# Patient Record
Sex: MALE | Race: BLACK OR AFRICAN AMERICAN | Employment: OTHER | URBAN - METROPOLITAN AREA
[De-identification: names, ages, dates, MRNs, and addresses within clinical notes are randomized per-mention and may not be internally consistent; named-entity substitution may affect disease eponyms.]

---

## 2017-04-20 ENCOUNTER — OFFICE VISIT (OUTPATIENT)
Dept: INTERNAL MEDICINE CLINIC | Age: 77
End: 2017-04-20

## 2017-04-20 VITALS
OXYGEN SATURATION: 100 % | TEMPERATURE: 95.5 F | WEIGHT: 147 LBS | DIASTOLIC BLOOD PRESSURE: 78 MMHG | HEIGHT: 67 IN | RESPIRATION RATE: 16 BRPM | HEART RATE: 58 BPM | SYSTOLIC BLOOD PRESSURE: 180 MMHG | BODY MASS INDEX: 23.07 KG/M2

## 2017-04-20 DIAGNOSIS — I10 ESSENTIAL HYPERTENSION: ICD-10-CM

## 2017-04-20 DIAGNOSIS — E11.21 DIABETIC NEPHROPATHY ASSOCIATED WITH TYPE 2 DIABETES MELLITUS (HCC): Primary | ICD-10-CM

## 2017-04-20 DIAGNOSIS — R10.31 GROIN PAIN, RIGHT: ICD-10-CM

## 2017-04-20 LAB — HBA1C MFR BLD HPLC: 7.5 %

## 2017-04-20 RX ORDER — LISINOPRIL 20 MG/1
TABLET ORAL
Refills: 11 | COMMUNITY
Start: 2017-03-23 | End: 2017-05-11

## 2017-04-20 RX ORDER — LANOLIN ALCOHOL/MO/W.PET/CERES
CREAM (GRAM) TOPICAL
Refills: 8 | COMMUNITY
Start: 2017-03-23 | End: 2018-01-01

## 2017-04-20 RX ORDER — CALCITRIOL 0.25 UG/1
CAPSULE ORAL
Refills: 9 | COMMUNITY
Start: 2017-03-23 | End: 2018-01-01

## 2017-04-20 NOTE — PROGRESS NOTES
Deborah Glass presents today for   Chief Complaint   Patient presents with    Hypertension    Diabetes    Other     possible hernia, pulled muscle       Deborah Glass preferred language for health care discussion is english/other. Is someone accompanying this pt? no    Is the patient using any DME equipment during OV? no    Depression Screening:  PHQ 2 / 9, over the last two weeks 12/8/2016 8/31/2016 6/14/2016 5/11/2016 1/5/2015 2/14/2014   Little interest or pleasure in doing things Not at all Not at all Not at all Not at all Not at all Not at all   Feeling down, depressed or hopeless Not at all Not at all Not at all Not at all Not at all Not at all   Total Score PHQ 2 0 0 0 0 0 0       Learning Assessment:  Learning Assessment 6/8/2015 11/5/2013   PRIMARY LEARNER Patient Patient   HIGHEST LEVEL OF EDUCATION - PRIMARY LEARNER  GRADUATED HIGH SCHOOL OR GED GRADUATED HIGH SCHOOL OR GED   PRIMARY [address]    NEED - No   LEARNER PREFERENCE PRIMARY DEMONSTRATION VIDEOS   LEARNING SPECIAL TOPICS - no   ANSWERED BY patient patient   RELATIONSHIP SELF SELF       Abuse Screening:  Abuse Screening Questionnaire 5/11/2016 6/8/2015 3/4/2015   Do you ever feel afraid of your partner? N N N   Are you in a relationship with someone who physically or mentally threatens you? N N N   Is it safe for you to go home? Pepe Reynolds       Fall Risk  Fall Risk Assessment, last 12 mths 12/8/2016 8/31/2016 6/14/2016 5/11/2016 1/5/2015 2/14/2014   Able to walk? Yes Yes Yes Yes Yes Yes   Fall in past 12 months? No No No No No No       Health Maintenance reviewed and discussed per provider. Yes    Deborah Glass is due for tdap, zoster, pneu. Please order/place referral if appropriate. Advance Directive:  1. Do you have an advance directive in place? Patient Reply: no    2. If not, would you like material regarding how to put one in place? Patient Reply: no    Coordination of Care:  1.  Have you been to the ER, urgent care clinic since your last visit? Hospitalized since your last visit? no    2. Have you seen or consulted any other health care providers outside of the [address] [place] [address] since your last visit? Include any pap smears or colon screening. No    Patient is fasting today.

## 2017-04-20 NOTE — MR AVS SNAPSHOT
Visit Information Date & Time Provider Department Dept. Phone Encounter #  
 4/20/2017  7:30 AM [address] [phone] Follow-up Instructions Return in about 4 months (around 8/20/2017) for diabetes. Upcoming Health Maintenance Date Due DTaP/Tdap/Td series (1 - Tdap) 1/29/1961 ZOSTER VACCINE AGE 60> 1/29/1999 FOOT EXAM Q1 9/27/2014 EYE EXAM RETINAL OR DILATED Q1 4/28/2017 Pneumococcal 65+ Low/Medium Risk (2 of 2 - PPSV23) 12/28/2017* MEDICARE YEARLY EXAM 5/12/2017 HEMOGLOBIN A1C Q6M 6/6/2017 LIPID PANEL Q1 8/31/2017 MICROALBUMIN Q1 12/6/2017 GLAUCOMA SCREENING Q2Y 4/28/2018 COLONOSCOPY 7/8/2021 *Topic was postponed. The date shown is not the original due date. Allergies as of 4/20/2017  Review Complete On: 4/20/2017 By: Tequila Holloway MD  
  
 Severity Noted Reaction Type Reactions Lisinopril  11/20/2014    Other (comments) Current Immunizations  Reviewed on 11/15/2013 Name Date Influenza High Dose Vaccine PF 10/1/2015 Influenza Vaccine Split 11/1/2012 Pneumococcal Vaccine (Unspecified Type) 11/11/2005 Not reviewed this visit You Were Diagnosed With   
  
 Codes Comments Diabetic nephropathy associated with type 2 diabetes mellitus ([facility]    -  Primary ICD-10-CM: E11.21 
ICD-9-CM: 250.40, 583.81 Essential hypertension     ICD-10-CM: I10 
ICD-9-CM: 401.9 Groin pain, right     ICD-10-CM: R10.31 ICD-9-CM: 789.09 Vitals BP Pulse Temp Resp Height(growth percentile) Weight(growth percentile) 180/78 (BP 1 Location: Left arm, BP Patient Position: Sitting) (!) 58 95.5 °F (35.3 °C) (Oral) 16 5' 7\" (1.702 m) 147 lb (66.7 kg) SpO2 BMI Smoking Status 100% 23.02 kg/m2 Former Smoker Vitals History BMI and BSA Data Body Mass Index Body Surface Area 23.02 kg/m 2 1.78 m 2 Preferred Pharmacy Pharmacy Name Phone [address] [place] [address].  [address] [phone] Your Updated Medication List  
  
   
This list is accurate as of: 4/20/17  8:26 AM.  Always use your most recent med list. amLODIPine 10 mg tablet Commonly known as:  NORVASC  
take 1 tablet by mouth once daily  
  
 aspirin delayed-release 81 mg tablet Take 1 Tab by mouth daily. atorvastatin 80 mg tablet Commonly known as:  LIPITOR  
TAKE 1 TABLET BY MOUTH EVERY NIGHT AT BEDTIME  
  
 calcitRIOL 0.25 mcg capsule Commonly known as:  ROCALTROL TK 1 C PO D  
  
 calcium-cholecalciferol (D3) tablet Commonly known as:  CALTRATE 600+D Take 1 Tab by mouth daily. carvedilol 12.5 mg tablet Commonly known as:  COREG  
TAKE 1 TABLET BY MOUTH TWICE DAILY WITH MEALS  
  
 ferrous sulfate 325 mg (65 mg iron) tablet TK 1 T PO QOD  
  
 folic acid 1 mg tablet Commonly known as:  FOLVITE  
take 1 tablet by mouth once daily  
  
 furosemide 40 mg tablet Commonly known as:  LASIX 1qd Insulin Lisp & Lisp Prot (Hum) 100 unit/mL (75-25) Inpn Commonly known as:  HumaLOG Mix 75-25 KwikPen 10 units am and 8 units pm  
  
 isosorbide mononitrate ER 30 mg tablet Commonly known as:  IMDUR  
1qd  
  
 lisinopril 20 mg tablet Commonly known as:  Miguel Ángel Leaver TK 1 T PO D  
  
 omeprazole 20 mg capsule Commonly known as:  PRILOSEC Take 1 Cap by mouth daily. pentoxifylline  mg CR tablet Commonly known as:  TRENTAL TAKE 1 TABLET BY MOUTH THREE TIMES DAILY  
  
 SYSTANE OP Apply  to eye. We Performed the Following AMB POC HEMOGLOBIN A1C [79246 CPT(R)]  DIABETES FOOT EXAM [7 Custom] Follow-up Instructions Return in about 4 months (around 8/20/2017) for diabetes. Patient Instructions Learning About Diabetes Food Guidelines Your Care Instructions Meal planning is important to manage diabetes. It helps keep your blood sugar at a target level (which you set with your doctor). You don't have to eat special foods. You can eat what your family eats, including sweets once in a while. But you do have to pay attention to how often you eat and how much you eat of certain foods. You may want to work with a dietitian or a certified diabetes educator (CDE) to help you plan meals and snacks. A dietitian or CDE can also help you lose weight if that is one of your goals. What should you know about eating carbs? Managing the amount of carbohydrate (carbs) you eat is an important part of healthy meals when you have diabetes. Carbohydrate is found in many foods. · Learn which foods have carbs. And learn the amounts of carbs in different foods. ¨ Bread, cereal, pasta, and rice have about 15 grams of carbs in a serving. A serving is 1 slice of bread (1 ounce), ½ cup of cooked cereal, or 1/3 cup of cooked pasta or rice. ¨ Fruits have 15 grams of carbs in a serving. A serving is 1 small fresh fruit, such as an apple or orange; ½ of a banana; ½ cup of cooked or canned fruit; ½ cup of fruit juice; 1 cup of melon or raspberries; or 2 tablespoons of dried fruit. ¨ Milk and no-sugar-added yogurt have 15 grams of carbs in a serving. A serving is 1 cup of milk or 2/3 cup of no-sugar-added yogurt. ¨ Starchy vegetables have 15 grams of carbs in a serving. A serving is ½ cup of mashed potatoes or sweet potato; 1 cup winter squash; ½ of a small baked potato; ½ cup of cooked beans; or ½ cup cooked corn or green peas. · Learn how much carbs to eat each day and at each meal. A dietitian or CDE can teach you how to keep track of the amount of carbs you eat. This is called carbohydrate counting. · If you are not sure how to count carbohydrate grams, use the Plate Method to plan meals.  It is a good, quick way to make sure that you have a balanced meal. It also helps you spread carbs throughout the day. ¨ Divide your plate by types of foods. Put non-starchy vegetables on half the plate, meat or other protein food on one-quarter of the plate, and a grain or starchy vegetable in the final quarter of the plate. To this you can add a small piece of fruit and 1 cup of milk or yogurt, depending on how many carbs you are supposed to eat at a meal. 
· Try to eat about the same amount of carbs at each meal. Do not \"save up\" your daily allowance of carbs to eat at one meal. 
· Proteins have very little or no carbs per serving. Examples of proteins are beef, chicken, turkey, fish, eggs, tofu, cheese, cottage cheese, and peanut butter. A serving size of meat is 3 ounces, which is about the size of a deck of cards. Examples of meat substitute serving sizes (equal to 1 ounce of meat) are 1/4 cup of cottage cheese, 1 egg, 1 tablespoon of peanut butter, and ½ cup of tofu. How can you eat out and still eat healthy? · Learn to estimate the serving sizes of foods that have carbohydrate. If you measure food at home, it will be easier to estimate the amount in a serving of restaurant food. · If the meal you order has too much carbohydrate (such as potatoes, corn, or baked beans), ask to have a low-carbohydrate food instead. Ask for a salad or green vegetables. · If you use insulin, check your blood sugar before and after eating out to help you plan how much to eat in the future. · If you eat more carbohydrate at a meal than you had planned, take a walk or do other exercise. This will help lower your blood sugar. What else should you know? · Limit saturated fat, such as the fat from meat and dairy products. This is a healthy choice because people who have diabetes are at higher risk of heart disease. So choose lean cuts of meat and nonfat or low-fat dairy products. Use olive or canola oil instead of butter or shortening when cooking. · Don't skip meals. Your blood sugar may drop too low if you skip meals and take insulin or certain medicines for diabetes. · Check with your doctor before you drink alcohol. Alcohol can cause your blood sugar to drop too low. Alcohol can also cause a bad reaction if you take certain diabetes medicines. Follow-up care is a key part of your treatment and safety. Be sure to make and go to all appointments, and call your doctor if you are having problems. It's also a good idea to know your test results and keep a list of the medicines you take. Where can you learn more? Go to http://melissa-carter.info/. Enter M036 in the search box to learn more about \"Learning About Diabetes Food Guidelines. \" Current as of: May 23, 2016 Content Version: 11.2 © 6565-2536 [employer]. Care instructions adapted under license by [employer] (which disclaims liability or warranty for this information). If you have questions about a medical condition or this instruction, always ask your healthcare professional. [place] [address] any warranty or liability for your use of this information. Introducing [facility]! [employer] introduces PreViser patient portal. Now you can access parts of your medical record, email your doctor's office, and request medication refills online. 1. In your internet browser, go to [email or web address]/"[email or web address] 2. Click on the First Time User? Click Here link in the Sign In box. You will see the New Member Sign Up page. 3. Enter your PreViser Access Code exactly as it appears below. You will not need to use this code after youve completed the sign-up process. If you do not sign up before the expiration date, you must request a new code. · PreViser Access Code: [email or web address] Expires: 7/19/2017  8:26 AM 
 
4.  Enter the last four digits of your Social Security Number (xxxx) and Date of Birth (mm/dd/yyyy) as indicated and click Submit. You will be taken to the next sign-up page. 5. Create a [employer] ID. This will be your [employer] login ID and cannot be changed, so think of one that is secure and easy to remember. 6. Create a [employer] password. You can change your password at any time. 7. Enter your Password Reset Question and Answer. This can be used at a later time if you forget your password. 8. Enter your e-mail address. You will receive e-mail notification when new information is available in [address] 9. Click Sign Up. You can now view and download portions of your medical record. 10. Click the Download Summary menu link to download a portable copy of your medical information. If you have questions, please visit the Frequently Asked Questions section of the [employer] website. Remember, [employer] is NOT to be used for urgent needs. For medical emergencies, dial 911. Now available from your iPhone and Android! Please provide this summary of care documentation to your next provider. Your primary care clinician is listed as Tera Mensah. If you have any questions after today's visit, please call [phone].

## 2017-04-20 NOTE — PROGRESS NOTES
HISTORY OF PRESENT ILLNESS  Jarvis Carlos is a 65 y.o. male. HPI Comments: 64 yo male here for f/u of DM, HTN. Reports glc doing better on home monitoring, 118 this morning. He does have occasional hypoglycemia with readings in 50s-60s when he skips meals. Sometimes holds his insulin dose. Has eye exam scheduled. Saw podiatry recently and plans to return to have nails trimmed. Recently started on lisinopril by his nephrologist. Elzbieta Casandra as allergy but he is tolerating this. BP elevated today. He reports white coat HTN and that /60 at home. He notes pulling sensation, discomfort R groin. Thinks he may have pulled a muscle, ? Bulge in area. Not necessarily changed by valsalva. Review of Systems   Constitutional: Negative for chills, fever and weight loss. HENT: Negative for congestion. Eyes: Negative for blurred vision and pain. Respiratory: Negative for cough and shortness of breath. Cardiovascular: Negative for chest pain, palpitations and leg swelling. Gastrointestinal: Negative for blood in stool, heartburn, melena, nausea and vomiting. Genitourinary: Negative for dysuria and hematuria. Musculoskeletal: Negative for joint pain and myalgias. Skin: Negative for itching and rash. Neurological: Negative for dizziness, tingling and headaches. Psychiatric/Behavioral: Negative for depression. The patient is not nervous/anxious.       Past Medical History:   Diagnosis Date    [facility]IO [facility]) [facility]     Anemia in chronic kidney disease     ASCVD (arteriosclerotic cardiovascular disease)     Benign hypertensive kidney disease     CKD (chronic kidney disease) stage 2, GFR 60-89 ml/min 4/18/2013    CKD (chronic kidney disease), stage III     Diabetes mellitus with chronic kidney disease ([facility]     Dialysis patient ([facility] 9/2014    Dyslipidemia     Heart attack ([facility] 9/2014    Hypertension     Left renal artery stenosis (HCC)     NSTEMI (non-ST elevated myocardial infarction) ([facility]     PAD (peripheral artery disease) ([facility]     PAF (paroxysmal atrial fibrillation) ([facility] 9/2014    Renal mass, right      Current Outpatient Prescriptions on File Prior to Visit   Medication Sig Dispense Refill    carvedilol (COREG) 12.5 mg tablet TAKE 1 TABLET BY MOUTH TWICE DAILY WITH MEALS 60 Tab 2    calcium-cholecalciferol, D3, (CALTRATE 600+D) tablet Take 1 Tab by mouth daily. 30 Tab 11    atorvastatin (LIPITOR) 80 mg tablet TAKE 1 TABLET BY MOUTH EVERY NIGHT AT BEDTIME 30 Tab 11    folic acid (FOLVITE) 1 mg tablet take 1 tablet by mouth once daily 30 Tab 11    pentoxifylline CR (TRENTAL) 400 mg CR tablet TAKE 1 TABLET BY MOUTH THREE TIMES DAILY 90 Tab 11    amLODIPine (NORVASC) 10 mg tablet take 1 tablet by mouth once daily 60 Tab 5    omeprazole (PRILOSEC) 20 mg capsule Take 1 Cap by mouth daily. 60 Cap 5    aspirin delayed-release 81 mg tablet Take 1 Tab by mouth daily. 60 Tab 5    Insulin Lisp & Lisp Prot, Hum, (HUMALOG MIX 75-25 KWIKPEN) 100 unit/mL (75-25) inpn 10 units am and 8 units pm 3 Pen 3    furosemide (LASIX) 40 mg tablet 1qd 60 Tab 5    isosorbide mononitrate ER (IMDUR) 30 mg tablet 1qd 60 Tab 5    PROPYLENE GLYCOL/ (SYSTANE OP) Apply  to eye. No current facility-administered medications on file prior to visit. Social History   Substance Use Topics    Smoking status: Former Smoker     Years: 15.00     Types: Cigarettes    Smokeless tobacco: Never Used    Alcohol use No     Visit Vitals    /78 (BP 1 Location: Left arm, BP Patient Position: Sitting)    Pulse (!) 58    Temp 95.5 °F (35.3 °C) (Oral)    Resp 16    Ht 5' 7\" (1.702 m)    Wt 147 lb (66.7 kg)    SpO2 100%    BMI 23.02 kg/m2     Physical Exam   Constitutional: He appears well-developed and well-nourished. No distress.    BP (!) 202/78 (BP 1 Location: Left arm, BP Patient Position: Sitting)  Pulse (!) 58  Temp 95.5 °F (35.3 °C) (Oral)   Resp 16  Ht 5' 7\" (1.702 m)  Wt 147 lb (66.7 kg)  SpO2 100%  BMI 23.02 kg/m2     Eyes: EOM are normal. Right eye exhibits no discharge. Left eye exhibits no discharge. No scleral icterus. Neck: Neck supple. Cardiovascular: Normal rate, regular rhythm and normal heart sounds. Exam reveals no gallop and no friction rub. No murmur heard. Pulmonary/Chest: Effort normal and breath sounds normal. No respiratory distress. He has no wheezes. He has no rales. Abdominal: Soft. He exhibits no distension and no mass. There is no tenderness. There is no rebound and no guarding. Musculoskeletal: He exhibits no edema or tenderness. Lymphadenopathy:     He has no cervical adenopathy. Neurological: He is alert. He exhibits normal muscle tone. Skin: Skin is warm and dry. Psychiatric: He has a normal mood and affect. Diabetic Foot exam: 2+ dorsalis pedis pulses bilaterally. Positive monofilament in all 10 toes and bottoms of both feet. Negative for lesions, signs of infection, or foot abnormalities other than thickened nails. Lab Results   Component Value Date/Time    Hemoglobin A1c 7.8 12/06/2016 11:15 AM    Hemoglobin A1c (POC) 7.6 08/31/2016 10:55 AM     Lab Results   Component Value Date/Time    Sodium 140 12/06/2016 11:15 AM    Potassium 4.7 12/06/2016 11:15 AM    Chloride 104 12/06/2016 11:15 AM    CO2 25 12/06/2016 11:15 AM    Glucose 177 12/06/2016 11:15 AM    BUN 40 12/06/2016 11:15 AM    Creatinine 3.3 12/06/2016 11:15 AM    BUN/Creatinine ratio 18 03/04/2015 02:14 PM    GFR est AA 23.0 12/06/2016 11:15 AM    GFR est non-AA 19 12/06/2016 11:15 AM    Calcium 7.7 12/06/2016 11:15 AM    Bilirubin, total 0.6 08/21/2014 02:41 PM    AST (SGOT) 29 08/21/2014 02:41 PM    Alk.  phosphatase 85 08/21/2014 02:41 PM    Protein, total 6.5 08/21/2014 02:41 PM    Albumin 2.6 12/06/2016 11:15 AM    A-G Ratio 1.6 08/21/2014 02:41 PM    ALT (SGPT) 36 08/21/2014 02:41 PM     Lab Results   Component Value Date/Time    Cholesterol, total 177 08/31/2016 11:42 AM    HDL Cholesterol 54 08/31/2016 11:42 AM    LDL, calculated 106.6 08/31/2016 11:42 AM    VLDL, calculated 16.4 08/31/2016 11:42 AM    Triglyceride 82 08/31/2016 11:42 AM    CHOL/HDL Ratio 3.3 08/31/2016 11:42 AM     ASSESSMENT and PLAN    ICD-10-CM ICD-9-CM    1. Diabetic nephropathy associated with type 2 diabetes mellitus (HCC) E11.21 250.40  DIABETES FOOT EXAM     583.81 AMB POC HEMOGLOBIN A1C   2. Essential hypertension I10 401.9    3. Groin pain, right R10.31 789.09    Repeat A1c today 7.5. Will hold on adjustments given occasional hypoglycemia. Asked that he work on keeping diet more consistent and monitor glc. BP not at goal, but he reports it is okay at home. Seems to be tolerating lisinopril. Will continue with current medication. Asked that he monitor at home. F/u with nephrologist as scheduled next week. No hernia appreciated on exam. He will monitor sx. If persist, will further assess with US.

## 2017-04-20 NOTE — PATIENT INSTRUCTIONS

## 2017-04-27 ENCOUNTER — TELEPHONE (OUTPATIENT)
Dept: INTERNAL MEDICINE CLINIC | Age: 77
End: 2017-04-27

## 2017-04-27 DIAGNOSIS — R10.31 GROIN PAIN, RIGHT: Primary | ICD-10-CM

## 2017-04-27 NOTE — TELEPHONE ENCOUNTER
Patient called and stated that he wants the test for the pain in his  right groin area that you both talked about. He did not know the name of the test but he is willing to have it done now.

## 2017-05-02 ENCOUNTER — OFFICE VISIT (OUTPATIENT)
Dept: INTERNAL MEDICINE CLINIC | Age: 77
End: 2017-05-02

## 2017-05-02 VITALS
DIASTOLIC BLOOD PRESSURE: 76 MMHG | TEMPERATURE: 95.9 F | HEART RATE: 56 BPM | WEIGHT: 145 LBS | SYSTOLIC BLOOD PRESSURE: 190 MMHG | RESPIRATION RATE: 16 BRPM | OXYGEN SATURATION: 100 % | HEIGHT: 67 IN | BODY MASS INDEX: 22.76 KG/M2

## 2017-05-02 DIAGNOSIS — I10 ESSENTIAL HYPERTENSION: ICD-10-CM

## 2017-05-02 DIAGNOSIS — R10.31 GROIN PAIN, RIGHT: Primary | ICD-10-CM

## 2017-05-02 RX ORDER — ISOSORBIDE MONONITRATE 30 MG/1
TABLET, EXTENDED RELEASE ORAL
Qty: 60 TAB | Refills: 5 | Status: SHIPPED | OUTPATIENT
Start: 2017-05-02 | End: 2018-01-01

## 2017-05-02 NOTE — PATIENT INSTRUCTIONS

## 2017-05-02 NOTE — PROGRESS NOTES
HISTORY OF PRESENT ILLNESS  Carol Lundy is a 65 y.o. male. HPI Comments: 64 yo male here for evaluation of continued R groin pain. Sx present intermittently over the past 1-2 months. No bulge noted. Normal BMs. US had been ordered but he did not get message left for him to schedule. HTN: BP is elevated today. He did not take his morning medications. States it is well controlled when he checks at home with SBP 130s. No CP, SOB. Review of Systems   Constitutional: Negative for chills, fever and weight loss. HENT: Negative for congestion. Eyes: Negative for blurred vision and pain. Respiratory: Negative for cough and shortness of breath. Cardiovascular: Negative for chest pain, palpitations and leg swelling. Gastrointestinal: Negative for blood in stool, constipation, diarrhea, heartburn, melena, nausea and vomiting. Abdominal pain: RLQ, groin. Genitourinary: Negative for frequency and urgency. Musculoskeletal: Negative for joint pain and myalgias. Neurological: Negative for dizziness, tingling and headaches. Psychiatric/Behavioral: Negative for depression. The patient is not nervous/anxious.       Past Medical History:   Diagnosis Date    NA[facility]ARIO [facility]) [facility]     Anemia in chronic kidney disease     ASCVD (arteriosclerotic cardiovascular disease)     Benign hypertensive kidney disease     CKD (chronic kidney disease) stage 2, GFR 60-89 ml/min 4/18/2013    CKD (chronic kidney disease), stage III     Diabetes mellitus with chronic kidney disease ([facility]     Dialysis patient ([facility] 9/2014    Dyslipidemia     Heart attack ([address] 9/2014    Hypertension     Left renal artery stenosis (HCC)     NSTEMI (non-ST elevated myocardial infarction) ([address]     PAD (peripheral artery disease) ([address]     PAF (paroxysmal atrial fibrillation) ([facility] 9/2014    Renal mass, right      Current Outpatient Prescriptions on File Prior to Visit   Medication Sig Dispense Refill    ferrous sulfate 325 mg (65 mg iron) tablet TK 1 T PO QOD  8    calcitRIOL (ROCALTROL) 0.25 mcg capsule TK 1 C PO D  9    lisinopril (PRINIVIL, ZESTRIL) 20 mg tablet TK 1 T PO D  11    carvedilol (COREG) 12.5 mg tablet TAKE 1 TABLET BY MOUTH TWICE DAILY WITH MEALS 60 Tab 2    calcium-cholecalciferol, D3, (CALTRATE 600+D) tablet Take 1 Tab by mouth daily. 30 Tab 11    atorvastatin (LIPITOR) 80 mg tablet TAKE 1 TABLET BY MOUTH EVERY NIGHT AT BEDTIME 30 Tab 11    folic acid (FOLVITE) 1 mg tablet take 1 tablet by mouth once daily 30 Tab 11    pentoxifylline CR (TRENTAL) 400 mg CR tablet TAKE 1 TABLET BY MOUTH THREE TIMES DAILY 90 Tab 11    amLODIPine (NORVASC) 10 mg tablet take 1 tablet by mouth once daily 60 Tab 5    omeprazole (PRILOSEC) 20 mg capsule Take 1 Cap by mouth daily. 60 Cap 5    aspirin delayed-release 81 mg tablet Take 1 Tab by mouth daily. 60 Tab 5    Insulin Lisp & Lisp Prot, Hum, (HUMALOG MIX 75-25 KWIKPEN) 100 unit/mL (75-25) inpn 10 units am and 8 units pm 3 Pen 3    furosemide (LASIX) 40 mg tablet 1qd 60 Tab 5    isosorbide mononitrate ER (IMDUR) 30 mg tablet 1qd 60 Tab 5    PROPYLENE GLYCOL/ (SYSTANE OP) Apply  to eye. No current facility-administered medications on file prior to visit. Social History   Substance Use Topics    Smoking status: Former Smoker     Years: 15.00     Types: Cigarettes    Smokeless tobacco: Never Used    Alcohol use No     Physical Exam   Constitutional: He appears well-developed and well-nourished. No distress. /76 (BP 1 Location: Left arm, BP Patient Position: Sitting)  Pulse (!) 56  Temp 95.9 °F (35.5 °C) (Oral)   Resp 16  Ht 5' 7\" (1.702 m)  Wt 145 lb (65.8 kg)  SpO2 100%  BMI 22.71 kg/m2     Eyes: EOM are normal. Right eye exhibits no discharge. Left eye exhibits no discharge. No scleral icterus. Cardiovascular: Normal rate, regular rhythm and normal heart sounds. Exam reveals no gallop and no friction rub.     No murmur heard.  Pulmonary/Chest: Effort normal and breath sounds normal. No respiratory distress. He has no wheezes. He has no rales. Abdominal: Soft. He exhibits no distension. There is tenderness (R inguinal region). There is no rebound and no guarding. Musculoskeletal: He exhibits no edema or tenderness. Neurological: He is alert. He exhibits normal muscle tone. Skin: Skin is warm and dry. Psychiatric: He has a normal mood and affect. Lab Results   Component Value Date/Time    Sodium 140 04/25/2017 11:35 AM    Potassium 4.4 04/25/2017 11:35 AM    Chloride 105 04/25/2017 11:35 AM    CO2 27 04/25/2017 11:35 AM    Glucose 117 04/25/2017 11:35 AM    BUN 64 04/25/2017 11:35 AM    Creatinine 3.9 04/25/2017 11:35 AM    BUN/Creatinine ratio 18 03/04/2015 02:14 PM    GFR est AA 19.0 04/25/2017 11:35 AM    GFR est non-AA 16 04/25/2017 11:35 AM    Calcium 8.5 04/25/2017 11:35 AM    Bilirubin, total 0.6 08/21/2014 02:41 PM    AST (SGOT) 29 08/21/2014 02:41 PM    Alk. phosphatase 85 08/21/2014 02:41 PM    Protein, total 6.5 08/21/2014 02:41 PM    Albumin 3.0 04/25/2017 11:35 AM    A-G Ratio 1.6 08/21/2014 02:41 PM    ALT (SGPT) 36 08/21/2014 02:41 PM     Lab Results   Component Value Date/Time    WBC 4.1 04/25/2017 11:35 AM    HGB 10.3 04/25/2017 11:35 AM    HCT 31.4 04/25/2017 11:35 AM    PLATELET 010 27/69/2920 11:35 AM    MCV 84.6 04/25/2017 11:35 AM     ASSESSMENT and PLAN    ICD-10-CM ICD-9-CM    1. Groin pain, right R10.31 789.09    2. Essential hypertension I10 401.9    Pt assisted with scheduling US. BP elevated but he reports it is well controlled when he checks at home. WIll continue current medication. Continue to monitor. Provided information on DASH diet.

## 2017-05-02 NOTE — MR AVS SNAPSHOT
Visit Information Date & Time Provider Department Dept. Phone Encounter #  
 5/2/2017  8:00 AM Jamia TerrellKasie Wunsch-Brautkleid [phone] [id number] Follow-up Instructions Return in about 1 month (around 6/2/2017), or if symptoms worsen or fail to improve, for hypertension, pain. Upcoming Health Maintenance Date Due DTaP/Tdap/Td series (1 - Tdap) 1/29/1960 ZOSTER VACCINE AGE 60> 1/29/1999 EYE EXAM RETINAL OR DILATED Q1 4/28/2017 Pneumococcal 65+ Low/Medium Risk (2 of 2 - PPSV23) 12/28/2017* MEDICARE YEARLY EXAM 5/12/2017 INFLUENZA AGE 9 TO ADULT 8/1/2017 LIPID PANEL Q1 8/31/2017 HEMOGLOBIN A1C Q6M 10/20/2017 MICROALBUMIN Q1 12/6/2017 FOOT EXAM Q1 4/20/2018 GLAUCOMA SCREENING Q2Y 4/28/2018 COLONOSCOPY 7/8/2021 *Topic was postponed. The date shown is not the original due date. Allergies as of 5/2/2017  Review Complete On: 5/2/2017 By: Jamia Terrell MD  
  
 Severity Noted Reaction Type Reactions Lisinopril  11/20/2014    Other (comments) Current Immunizations  Reviewed on 11/15/2013 Name Date Influenza High Dose Vaccine PF 10/1/2015 Influenza Vaccine Split 11/1/2012 Pneumococcal Vaccine (Unspecified Type) 11/11/2005 Not reviewed this visit You Were Diagnosed With   
  
 Codes Comments Groin pain, right    -  Primary ICD-10-CM: R10.31 ICD-9-CM: 789.09 Essential hypertension     ICD-10-CM: I10 
ICD-9-CM: 401.9 Vitals BP Pulse Temp Resp Height(growth percentile) Weight(growth percentile) 190/76 (BP 1 Location: Left arm, BP Patient Position: Sitting) (!) 56 95.9 °F (35.5 °C) (Oral) 16 5' 7\" (1.702 m) 145 lb (65.8 kg) SpO2 BMI Smoking Status 100% 22.71 kg/m2 Former Smoker Vitals History BMI and BSA Data Body Mass Index Body Surface Area  
 22.71 kg/m 2 1.76 m 2 Preferred Pharmacy Pharmacy Name Phone [address] [place] [address] [address] [place] [address] [phone] Your Updated Medication List  
  
   
This list is accurate as of: 17  8:42 AM.  Always use your most recent med list. amLODIPine 10 mg tablet Commonly known as:  NORVASC  
take 1 tablet by mouth once daily  
  
 aspirin delayed-release 81 mg tablet Take 1 Tab by mouth daily. atorvastatin 80 mg tablet Commonly known as:  LIPITOR  
TAKE 1 TABLET BY MOUTH EVERY NIGHT AT BEDTIME  
  
 calcitRIOL 0.25 mcg capsule Commonly known as:  ROCALTROL TK 1 C PO D  
  
 carvedilol 12.5 mg tablet Commonly known as:  COREG  
TAKE 1 TABLET BY MOUTH TWICE DAILY WITH MEALS  
  
 ferrous sulfate 325 mg (65 mg iron) tablet TK 1 T PO QOD  
  
 folic acid 1 mg tablet Commonly known as:  FOLVITE  
take 1 tablet by mouth once daily  
  
 furosemide 40 mg tablet Commonly known as:  LASIX 1qd Insulin Lisp & Lisp Prot (Hum) 100 unit/mL (75-25) Inpn Commonly known as:  HumaLOG Mix 75-25 KwikPen 10 units am and 8 units pm  
  
 isosorbide mononitrate ER 30 mg tablet Commonly known as:  IMDUR  
1qd  
  
 lisinopril 20 mg tablet Commonly known as:  Lara Camel TK 1 T PO D  
  
 omeprazole 20 mg capsule Commonly known as:  PRILOSEC Take 1 Cap by mouth daily. pentoxifylline  mg CR tablet Commonly known as:  TRENTAL TAKE 1 TABLET BY MOUTH THREE TIMES DAILY Prescriptions Sent to Pharmacy Refills  
 isosorbide mononitrate ER (IMDUR) 30 mg tablet 5 Siqd Class: Normal  
 Pharmacy: [facility] [address] [place] [address] [address] [place] [address] #: [phone] Follow-up Instructions Return in about 1 month (around 2017), or if symptoms worsen or fail to improve, for hypertension, pain.   
  
To-Do List   
 2017 10:00 AM  
 Appointment with [facility] RAD US RM 1 at [facility] ([phone]) OUTSIDE FILMS  - Any outside films related to the study being scheduled should be brought with you on the day of the exam.  If this cannot be done there may be a delay in the reading of the study. NPO -Patient must be NPO (no food or drink) 8 hours prior to the exam.  MEDICATIONS  - Patient must bring a complete list of all medications currently taking to include prescriptions, over-the-counter meds, herbals, vitamins & any dietary supplements Patient Instructions DASH Diet: Care Instructions Your Care Instructions The DASH diet is an eating plan that can help lower your blood pressure. DASH stands for Dietary Approaches to Stop Hypertension. Hypertension is high blood pressure. The DASH diet focuses on eating foods that are high in calcium, potassium, and magnesium. These nutrients can lower blood pressure. The foods that are highest in these nutrients are fruits, vegetables, low-fat dairy products, nuts, seeds, and legumes. But taking calcium, potassium, and magnesium supplements instead of eating foods that are high in those nutrients does not have the same effect. The DASH diet also includes whole grains, fish, and poultry. The DASH diet is one of several lifestyle changes your doctor may recommend to lower your high blood pressure. Your doctor may also want you to decrease the amount of sodium in your diet. Lowering sodium while following the DASH diet can lower blood pressure even further than just the DASH diet alone. Follow-up care is a key part of your treatment and safety. Be sure to make and go to all appointments, and call your doctor if you are having problems. It's also a good idea to know your test results and keep a list of the medicines you take. How can you care for yourself at home? Following the DASH diet · Eat 4 to 5 servings of fruit each day.  A serving is 1 medium-sized piece of fruit, ½ cup chopped or canned fruit, 1/4 cup dried fruit, or 4 ounces (½ cup) of fruit juice. Choose fruit more often than fruit juice. · Eat 4 to 5 servings of vegetables each day. A serving is 1 cup of lettuce or raw leafy vegetables, ½ cup of chopped or cooked vegetables, or 4 ounces (½ cup) of vegetable juice. Choose vegetables more often than vegetable juice. · Get 2 to 3 servings of low-fat and fat-free dairy each day. A serving is 8 ounces of milk, 1 cup of yogurt, or 1 ½ ounces of cheese. · Eat 6 to 8 servings of grains each day. A serving is 1 slice of bread, 1 ounce of dry cereal, or ½ cup of cooked rice, pasta, or cooked cereal. Try to choose whole-grain products as much as possible. · Limit lean meat, poultry, and fish to 2 servings each day. A serving is 3 ounces, about the size of a deck of cards. · Eat 4 to 5 servings of nuts, seeds, and legumes (cooked dried beans, lentils, and split peas) each week. A serving is 1/3 cup of nuts, 2 tablespoons of seeds, or ½ cup of cooked beans or peas. · Limit fats and oils to 2 to 3 servings each day. A serving is 1 teaspoon of vegetable oil or 2 tablespoons of salad dressing. · Limit sweets and added sugars to 5 servings or less a week. A serving is 1 tablespoon jelly or jam, ½ cup sorbet, or 1 cup of lemonade. · Eat less than 2,300 milligrams (mg) of sodium a day. If you limit your sodium to 1,500 mg a day, you can lower your blood pressure even more. Tips for success · Start small. Do not try to make dramatic changes to your diet all at once. You might feel that you are missing out on your favorite foods and then be more likely to not follow the plan. Make small changes, and stick with them. Once those changes become habit, add a few more changes. · Try some of the following: ¨ Make it a goal to eat a fruit or vegetable at every meal and at snacks. This will make it easy to get the recommended amount of fruits and vegetables each day. ¨ Try yogurt topped with fruit and nuts for a snack or healthy dessert. ¨ Add lettuce, tomato, cucumber, and onion to sandwiches. ¨ Combine a ready-made pizza crust with low-fat mozzarella cheese and lots of vegetable toppings. Try using tomatoes, squash, spinach, broccoli, carrots, cauliflower, and onions. ¨ Have a variety of cut-up vegetables with a low-fat dip as an appetizer instead of chips and dip. ¨ Sprinkle sunflower seeds or chopped almonds over salads. Or try adding chopped walnuts or almonds to cooked vegetables. ¨ Try some vegetarian meals using beans and peas. Add garbanzo or kidney beans to salads. Make burritos and tacos with mashed garrett beans or black beans. Where can you learn more? Go to http://melissaeblizzcarter.info/. Enter K746 in the search box to learn more about \"DASH Diet: Care Instructions. \" Current as of: March 23, 2016 Content Version: 11.2 © 6010-4590 [employer]. Care instructions adapted under license by [employer] (which disclaims liability or warranty for this information). If you have questions about a medical condition or this instruction, always ask your healthcare professional. [address] any warranty or liability for your use of this information. Introducing [facility]! Gayle Kennedy introduces [employer] patient portal. Now you can access parts of your medical record, email your doctor's office, and request medication refills online. 1. In your internet browser, go to [email or web address] 2. Click on the First Time User? Click Here link in the Sign In box. You will see the New Member Sign Up page. 3. Enter your [employer] Access Code exactly as it appears below. You will not need to use this code after youve completed the sign-up process. If you do not sign up before the expiration date, you must request a new code.  
 
· [employer] Access Code: [email or web address] 
 Expires: 7/19/2017  8:26 AM 
 
4. Enter the last four digits of your Social Security Number (xxxx) and Date of Birth (mm/dd/yyyy) as indicated and click Submit. You will be taken to the next sign-up page. 5. Create a [employer] ID. This will be your [employer] login ID and cannot be changed, so think of one that is secure and easy to remember. 6. Create a [employer] password. You can change your password at any time. 7. Enter your Password Reset Question and Answer. This can be used at a later time if you forget your password. 8. Enter your e-mail address. You will receive e-mail notification when new information is available in [address] 9. Click Sign Up. You can now view and download portions of your medical record. 10. Click the Download Summary menu link to download a portable copy of your medical information. If you have questions, please visit the Frequently Asked Questions section of the [employer] website. Remember, [employer] is NOT to be used for urgent needs. For medical emergencies, dial 911. Now available from your iPhone and Android! Please provide this summary of care documentation to your next provider. Your primary care clinician is listed as Tera Mensah. If you have any questions after today's visit, please call [phone].

## 2017-05-02 NOTE — PROGRESS NOTES
Pt presents today with C/O pain in Right groin area     Pt preferred language for health care discussion is english. Is someone accompanying this pt? no    Is the patient using any DME equipment during OV? no    Depression Screening completed. yes    Learning Assessment completed. yes    Abuse Screening completed. Yes    Health Maintenance reviewed and discussed per provider. yes    Pt is due for Zoster Tdap patient has a an appt for an eye exam this week. Please order/place referral if appropriate. Advance Directive:  1. Do you have an advance directive in place? Patient Reply: Yes    Coordination of Care:  1. Have you been to the ER, urgent care clinic since your last visit? Hospitalized since your last visit? No    2. Have you seen or consulted any other health care providers outside of the [address] [place] [address] since your last visit? Include any pap smears or colon screening.  No

## 2017-05-05 ENCOUNTER — HOSPITAL ENCOUNTER (OUTPATIENT)
Dept: ULTRASOUND IMAGING | Age: 77
Discharge: HOME | End: 2017-05-05
Attending: INTERNAL MEDICINE
Payer: MEDICARE

## 2017-05-05 DIAGNOSIS — R10.31 GROIN PAIN, RIGHT: ICD-10-CM

## 2017-05-05 PROCEDURE — 76882 US LMTD JT/FCL EVL NVASC XTR: CPT

## 2017-05-05 NOTE — PROGRESS NOTES
Please let him know his US showed a small fat containing hernia, but did not seem to contain any bowel. If his symptoms are persisting, a CT of the abdomen and pelvis can be ordered to further assess the area.

## 2017-05-06 ENCOUNTER — TELEPHONE (OUTPATIENT)
Dept: INTERNAL MEDICINE CLINIC | Age: 77
End: 2017-05-06

## 2017-05-06 NOTE — TELEPHONE ENCOUNTER
----- Message from Tequila Holloway MD sent at 5/5/2017 12:48 PM EDT -----  Please let him know his US showed a small fat containing hernia, but did not seem to contain any bowel. If his symptoms are persisting, a CT of the abdomen and pelvis can be ordered to further assess the area.

## 2017-05-06 NOTE — TELEPHONE ENCOUNTER
Attempted to contact pt at  number, pt was not available. Left message for pt to return phone call to office. Will continue to try to contact pt.

## 2017-05-08 ENCOUNTER — OFFICE VISIT (OUTPATIENT)
Dept: INTERNAL MEDICINE CLINIC | Age: 77
End: 2017-05-08

## 2017-05-08 VITALS
OXYGEN SATURATION: 99 % | HEART RATE: 59 BPM | SYSTOLIC BLOOD PRESSURE: 178 MMHG | WEIGHT: 145 LBS | HEIGHT: 67 IN | TEMPERATURE: 97.9 F | RESPIRATION RATE: 14 BRPM | BODY MASS INDEX: 22.76 KG/M2 | DIASTOLIC BLOOD PRESSURE: 64 MMHG

## 2017-05-08 DIAGNOSIS — I10 ESSENTIAL HYPERTENSION: ICD-10-CM

## 2017-05-08 DIAGNOSIS — R10.31 INGUINAL PAIN, RIGHT: Primary | ICD-10-CM

## 2017-05-08 RX ORDER — CALCIUM CARBONATE/VITAMIN D3 600 MG-10
TABLET ORAL
Refills: 7 | COMMUNITY
Start: 2017-04-21 | End: 2017-05-08 | Stop reason: SDUPTHER

## 2017-05-08 RX ORDER — TRAMADOL HYDROCHLORIDE 50 MG/1
TABLET ORAL
Qty: 40 TAB | Refills: 1 | Status: SHIPPED | OUTPATIENT
Start: 2017-05-08 | End: 2017-05-16

## 2017-05-08 RX ORDER — PEN NEEDLE, DIABETIC 32GX 5/32"
NEEDLE, DISPOSABLE MISCELLANEOUS
Qty: 200 PEN NEEDLE | Refills: 3 | Status: SHIPPED | OUTPATIENT
Start: 2017-05-08 | End: 2018-01-01 | Stop reason: SDUPTHER

## 2017-05-08 NOTE — TELEPHONE ENCOUNTER
Requested Prescriptions     Pending Prescriptions Disp Refills    OCTAVIO PEN NEEDLE 32 gauge x 5/32\" ndle [Pharmacy Med Name: B-D PEN NDL OCTAVIO [id number](5/32) GRN]  0     Sig: USE TWICE DAILY AS DIRECTED

## 2017-05-08 NOTE — MR AVS SNAPSHOT
Visit Information Date & Time Provider Department Dept. Phone Encounter #  
 5/8/2017  1:00 PM Cecilia RobertsonkaylaKasie Walk Score [phone] Follow-up Instructions Return in about 3 months (around 8/8/2017), or if symptoms worsen or fail to improve, for hypertension. Your Appointments 6/2/2017  8:00 AM  
Office Visit with MD TRICE Ayala [facility]) Appt Note: 1 month f/u HTN/pain [address]  
  
   
 [address] [facility] Upcoming Health Maintenance Date Due DTaP/Tdap/Td series (1 - Tdap) 1/29/1960 ZOSTER VACCINE AGE 60> 1/29/1999 EYE EXAM RETINAL OR DILATED Q1 4/28/2017 Pneumococcal 65+ Low/Medium Risk (2 of 2 - PPSV23) 12/28/2017* MEDICARE YEARLY EXAM 5/12/2017 INFLUENZA AGE 9 TO ADULT 8/1/2017 LIPID PANEL Q1 8/31/2017 HEMOGLOBIN A1C Q6M 10/20/2017 MICROALBUMIN Q1 12/6/2017 FOOT EXAM Q1 4/20/2018 GLAUCOMA SCREENING Q2Y 4/28/2018 COLONOSCOPY 7/8/2021 *Topic was postponed. The date shown is not the original due date. Allergies as of 5/8/2017  Review Complete On: 5/8/2017 By: Toshia Abrams LPN Severity Noted Reaction Type Reactions Lisinopril  11/20/2014    Other (comments) Current Immunizations  Reviewed on 11/15/2013 Name Date Influenza High Dose Vaccine PF 10/1/2015 Influenza Vaccine Split 11/1/2012 Pneumococcal Vaccine (Unspecified Type) 11/11/2005 Not reviewed this visit You Were Diagnosed With   
  
 Codes Comments Inguinal pain, right    -  Primary ICD-10-CM: R10.31 ICD-9-CM: 789.09 Essential hypertension     ICD-10-CM: I10 
ICD-9-CM: 401.9 Vitals BP Pulse Temp Resp Height(growth percentile) Weight(growth percentile)  
 178/64 (!) 59 97.9 °F (36.6 °C) (Oral) 14 5' 7\" (1.702 m) 145 lb (65.8 kg) SpO2 BMI Smoking Status 99% 22.71 kg/m2 Former Smoker BMI and BSA Data Body Mass Index Body Surface Area  
 22.71 kg/m 2 1.76 m 2 Preferred Pharmacy Pharmacy Name Phone [facility], [facility]  [address] [phone] Your Updated Medication List  
  
   
This list is accurate as of: 5/8/17  1:35 PM.  Always use your most recent med list. amLODIPine 10 mg tablet Commonly known as:  NORVASC  
take 1 tablet by mouth once daily  
  
 aspirin delayed-release 81 mg tablet Take 1 Tab by mouth daily. atorvastatin 80 mg tablet Commonly known as:  LIPITOR  
TAKE 1 TABLET BY MOUTH EVERY NIGHT AT BEDTIME  
  
 calcitRIOL 0.25 mcg capsule Commonly known as:  ROCALTROL TK 1 C PO D  
  
 carvedilol 12.5 mg tablet Commonly known as:  COREG  
TAKE 1 TABLET BY MOUTH TWICE DAILY WITH MEALS  
  
 ferrous sulfate 325 mg (65 mg iron) tablet TK 1 T PO QOD  
  
 folic acid 1 mg tablet Commonly known as:  FOLVITE  
take 1 tablet by mouth once daily  
  
 furosemide 40 mg tablet Commonly known as:  LASIX 1qd Insulin Lisp & Lisp Prot (Hum) 100 unit/mL (75-25) Inpn Commonly known as:  HumaLOG Mix 75-25 KwikPen 10 units am and 8 units pm  
  
 isosorbide mononitrate ER 30 mg tablet Commonly known as:  IMDUR  
1qd  
  
 lisinopril 20 mg tablet Commonly known as:  Alessia Feil TK 1 T PO D Rose Pen Needle 32 gauge x 5/32\" Ndle Generic drug:  Insulin Needles (Disposable) USE TWICE DAILY AS DIRECTED  
  
 omeprazole 20 mg capsule Commonly known as:  PRILOSEC Take 1 Cap by mouth daily. pentoxifylline  mg CR tablet Commonly known as:  TRENTAL TAKE 1 TABLET BY MOUTH THREE TIMES DAILY  
  
 traMADol 50 mg tablet Commonly known as:  ULTRAM  
1 tab po q 12 hours if needed for severe pain  Indications: Pain Prescriptions Printed Refills  
 traMADol (ULTRAM) 50 mg tablet 1 Si tab po q 12 hours if needed for severe pain  Indications: Pain Class: Print Follow-up Instructions Return in about 3 months (around 2017), or if symptoms worsen or fail to improve, for hypertension. To-Do List   
 Around 2017 Imaging:  CT ABD PELV WO CONT Referral Information Referral ID Referred By Referred To  
  
 [id number] Ashlie MARQUEZ Not Available Visits Status Start Date End Date 1 New Request 17 If your referral has a status of pending review or denied, additional information will be sent to support the outcome of this decision. Patient Instructions Computed Tomography (CT) Scan: About This Test 
What is it? A computed tomography (CT) scan uses X-rays to make detailed pictures of parts of your body and the structures inside your body. During the test, you will lie on a table that is attached to the [employer]. The CT scanner is a large doughnut-shaped machine. Why is this test done? Doctors use CT scans to study areas of the body, such as the brain, chest, or belly. CT scans are also used to assist or check on the success of a procedure or surgery. An example of this is when a CT is used to guide a needle into the body during a tissue biopsy. How can you prepare for the test? 
Talk to your doctor about all your health conditions before the test. For example, tell your doctor if: 
· You are or might be pregnant. · You are breastfeeding. · You have diabetes. · You take metformin. · You are allergic to any medicines. · You have had an X-ray test using barium contrast material in the past 4 days. · You get nervous in confined spaces. You may need medicine to help you relax. What happens before the test? 
· You may be asked to take off your jewelry. · You will take off all or most of your clothes and then change into a gown. · If you do leave some clothes on, make sure you take everything out of your pockets. · You may have contrast materials (dye) put into an IV in your arm. In some cases, you may have to drink a contrast material. Contrast material helps doctors see specific organs, blood vessels, and most tumors. What happens during the test? 
· You will lie on a table that is attached to the CT scanner. · The table slides into the round opening of the scanner. The table will move during the scan. The scanner moves within the doughnut-shaped casing around your body. · You will be asked to hold still during the scan. You may be asked to hold your breath for short periods. · You may be alone in the scanning room, but a technologist will be watching you through a window and talking with you during the test. 
What else should you know about the test? 
· A CT scan does not hurt. · If a dye is used, you may feel a quick sting or pinch when the IV is started. The dye may make you feel warm and flushed and give you a metallic taste in your mouth. Some people feel sick to their stomach or get a headache. · If you breastfeed and are concerned about whether the dye used in this test is safe, talk to your doctor. Most experts believe that very little dye passes into breast milk and even less is passed on to the baby. But if you prefer, you can store some of your breast milk ahead of time and use it for a day or two after the test. 
· There is a small chance of getting cancer from some types of CT scans. The risk is higher in children, young adults, and people who have many radiation tests. If you are concerned about this risk, talk to your doctor about the benefits and risks of a CT scan and confirm that the test is needed. How long does the test take? · The test will take about 30 to 60 minutes. Most of this time is spent getting ready for the scan. The actual test only takes a few seconds.  
What happens after the test? 
 · Depending on the reason for the test, you will probably be able to go home right away. · If contrast material was used, drink lots of liquids for 24 hours after the test unless your doctor tells you not to. Follow-up care is a key part of your treatment and safety. Be sure to make and go to all appointments, and call your doctor if you are having problems. It's also a good idea to keep a list of the medicines you take. Ask your doctor when you can expect to have your test results. Where can you learn more? Go to http://melissa-carter.info/. Enter [id number] in the search box to learn more about \"Computed Tomography (CT) Scan: About This Test.\" Current as of: October 14, 2016 Content Version: 11.2 © 5118-9179 [employer]. Care instructions adapted under license by [employer] (which disclaims liability or warranty for this information). If you have questions about a medical condition or this instruction, always ask your healthcare professional. [address] any warranty or liability for your use of this information. Introducing \[facility]\"" & HEALTH SERVICES! Nancy Hans introduces plista patient portal. Now you can access parts of your medical record, email your doctor's office, and request medication refills online. 1. In your internet browser, go to [email or web address] 2. Click on the First Time User? Click Here link in the Sign In box. You will see the New Member Sign Up page. 3. Enter your plista Access Code exactly as it appears below. You will not need to use this code after youve completed the sign-up process. If you do not sign up before the expiration date, you must request a new code. · plista Access Code: [email or web address] Expires: 7/19/2017  8:26 AM 
 
4. Enter the last four digits of your Social Security Number (xxxx) and Date of Birth (mm/dd/yyyy) as indicated and click Submit.  You will be taken to the next sign-up page. 5. Create a Applyful ID. This will be your Applyful login ID and cannot be changed, so think of one that is secure and easy to remember. 6. Create a Applyful password. You can change your password at any time. 7. Enter your Password Reset Question and Answer. This can be used at a later time if you forget your password. 8. Enter your e-mail address. You will receive e-mail notification when new information is available in [address] 9. Click Sign Up. You can now view and download portions of your medical record. 10. Click the Download Summary menu link to download a portable copy of your medical information. If you have questions, please visit the Frequently Asked Questions section of the Applyful website. Remember, Applyful is NOT to be used for urgent needs. For medical emergencies, dial 911. Now available from your iPhone and Android! Please provide this summary of care documentation to your next provider. Your primary care clinician is listed as Tera Mensah. If you have any questions after today's visit, please call [phone].

## 2017-05-08 NOTE — PROGRESS NOTES
HISTORY OF PRESENT ILLNESS  Colt Jeffries is a 65 y.o. male. HPI Comments: 64 yo male here for f/u of R inguinal pain. Had [address] last week which showed fat containing hernia. He notes continued pain at site. Has not taken anything for this. Difficulty determining what exacerbates sx. Notes increased pain at times with prolonged standing, certain movements. No changes in BM. His BP is elevated today but he states it is 130s/70s when checked at home. No CP, SOB. Review of Systems   Constitutional: Negative for chills, fever and weight loss. Respiratory: Negative for cough and shortness of breath. Cardiovascular: Negative for chest pain, palpitations and leg swelling. Gastrointestinal: Negative for blood in stool, melena, nausea and vomiting. Neurological: Negative for dizziness and tingling. Psychiatric/Behavioral: Negative for depression. The patient is not nervous/anxious.       Past Medical History:   Diagnosis Date    [facility]     Anemia in chronic kidney disease     ASCVD (arteriosclerotic cardiovascular disease)     Benign hypertensive kidney disease     CKD (chronic kidney disease) stage 2, GFR 60-89 ml/min 4/18/2013    CKD (chronic kidney disease), stage III     Diabetes mellitus with chronic kidney disease ([facility] [facility]     Dialysis patient ([facility] [address] 9/2014    Dyslipidemia     Heart attack ([facility] [address] 9/2014    Hypertension     Left renal artery stenosis (HCC)     NSTEMI (non-ST elevated myocardial infarction) ([address]     [facility]per[facility]) [facility]     PAF (paroxysmal atrial fibrillation) ([facility] [address] 9/2014    Renal mass, right      Current Outpatient Prescriptions on File Prior to Visit   Medication Sig Dispense Refill    OCTAVIO PEN NEEDLE 32 gauge x 5/32\" ndle USE TWICE DAILY AS DIRECTED 200 Pen Needle 3    isosorbide mononitrate ER (IMDUR) 30 mg tablet 1qd 60 Tab 5    ferrous sulfate 325 mg (65 mg iron) tablet TK 1 T PO QOD  8    calcitRIOL (ROCALTROL) 0.25 mcg capsule TK 1 C PO D  9    lisinopril (PRINIVIL, ZESTRIL) 20 mg tablet TK 1 T PO D  11    carvedilol (COREG) 12.5 mg tablet TAKE 1 TABLET BY MOUTH TWICE DAILY WITH MEALS 60 Tab 2    atorvastatin (LIPITOR) 80 mg tablet TAKE 1 TABLET BY MOUTH EVERY NIGHT AT BEDTIME 30 Tab 11    folic acid (FOLVITE) 1 mg tablet take 1 tablet by mouth once daily 30 Tab 11    pentoxifylline CR (TRENTAL) 400 mg CR tablet TAKE 1 TABLET BY MOUTH THREE TIMES DAILY 90 Tab 11    amLODIPine (NORVASC) 10 mg tablet take 1 tablet by mouth once daily 60 Tab 5    aspirin delayed-release 81 mg tablet Take 1 Tab by mouth daily. 60 Tab 5    Insulin Lisp & Lisp Prot, Hum, (HUMALOG MIX 75-25 KWIKPEN) 100 unit/mL (75-25) inpn 10 units am and 8 units pm 3 Pen 3    furosemide (LASIX) 40 mg tablet 1qd 60 Tab 5    omeprazole (PRILOSEC) 20 mg capsule Take 1 Cap by mouth daily. 60 Cap 5     No current facility-administered medications on file prior to visit. Social History   Substance Use Topics    Smoking status: Former Smoker     Years: 15.00     Types: Cigarettes    Smokeless tobacco: Never Used    Alcohol use No     Physical Exam   Constitutional: He appears well-developed and well-nourished. No distress. /64  Pulse (!) 59  Temp 97.9 °F (36.6 °C) (Oral)   Resp 14  Ht 5' 7\" (1.702 m)  Wt 145 lb (65.8 kg)  SpO2 99%  BMI 22.71 kg/m2     Eyes: Right eye exhibits no discharge. Left eye exhibits no discharge. No scleral icterus. Cardiovascular: Normal rate, regular rhythm and normal heart sounds. Exam reveals no gallop and no friction rub. No murmur heard. Pulmonary/Chest: Effort normal and breath sounds normal. No respiratory distress. He has no wheezes. He has no rales. Musculoskeletal: He exhibits no edema. Neurological: He is alert. Skin: Skin is warm and dry. Psychiatric: He has a normal mood and affect.      Lab Results   Component Value Date/Time    Sodium 140 04/25/2017 11:35 AM    Potassium 4.4 04/25/2017 11:35 AM    Chloride 105 04/25/2017 11:35 AM    CO2 27 04/25/2017 11:35 AM    Glucose 117 04/25/2017 11:35 AM    BUN 64 04/25/2017 11:35 AM    Creatinine 3.9 04/25/2017 11:35 AM    BUN/Creatinine ratio 18 03/04/2015 02:14 PM    GFR est AA 19.0 04/25/2017 11:35 AM    GFR est non-AA 16 04/25/2017 11:35 AM    Calcium 8.5 04/25/2017 11:35 AM    Bilirubin, total 0.6 08/21/2014 02:41 PM    AST (SGOT) 29 08/21/2014 02:41 PM    Alk. phosphatase 85 08/21/2014 02:41 PM    Protein, total 6.5 08/21/2014 02:41 PM    Albumin 3.0 04/25/2017 11:35 AM    A-G Ratio 1.6 08/21/2014 02:41 PM    ALT (SGPT) 36 08/21/2014 02:41 PM     ASSESSMENT and PLAN    ICD-10-CM ICD-9-CM    1. Inguinal pain, right R10.31 789.09 CT ABD PELV WO CONT   2. Essential hypertension I10 401.9    Discussed US with fat containing hernia. Will further assess with CT (noncon) and can try tramadol if needed for pain. BP is elevated today but reports good control at home. Asked that he bring his home cuff to next visit.

## 2017-05-08 NOTE — PATIENT INSTRUCTIONS
Computed Tomography (CT) Scan: About This Test  What is it? A computed tomography (CT) scan uses X-rays to make detailed pictures of parts of your body and the structures inside your body. During the test, you will lie on a table that is attached to the [employer]. The CT scanner is a large doughnut-shaped machine. Why is this test done? Doctors use CT scans to study areas of the body, such as the brain, chest, or belly. CT scans are also used to assist or check on the success of a procedure or surgery. An example of this is when a CT is used to guide a needle into the body during a tissue biopsy. How can you prepare for the test?  Talk to your doctor about all your health conditions before the test. For example, tell your doctor if:  · You are or might be pregnant. · You are breastfeeding. · You have diabetes. · You take metformin. · You are allergic to any medicines. · You have had an X-ray test using barium contrast material in the past 4 days. · You get nervous in confined spaces. You may need medicine to help you relax. What happens before the test?  · You may be asked to take off your jewelry. · You will take off all or most of your clothes and then change into a gown. · If you do leave some clothes on, make sure you take everything out of your pockets. · You may have contrast materials (dye) put into an IV in your arm. In some cases, you may have to drink a contrast material. Contrast material helps doctors see specific organs, blood vessels, and most tumors. What happens during the test?  · You will lie on a table that is attached to the CT scanner. · The table slides into the round opening of the scanner. The table will move during the scan. The scanner moves within the doughnut-shaped casing around your body. · You will be asked to hold still during the scan. You may be asked to hold your breath for short periods.   · You may be alone in the scanning room, but a technologist will be watching you through a window and talking with you during the test.  What else should you know about the test?  · A CT scan does not hurt. · If a dye is used, you may feel a quick sting or pinch when the IV is started. The dye may make you feel warm and flushed and give you a metallic taste in your mouth. Some people feel sick to their stomach or get a headache. · If you breastfeed and are concerned about whether the dye used in this test is safe, talk to your doctor. Most experts believe that very little dye passes into breast milk and even less is passed on to the baby. But if you prefer, you can store some of your breast milk ahead of time and use it for a day or two after the test.  · There is a small chance of getting cancer from some types of CT scans. The risk is higher in children, young adults, and people who have many radiation tests. If you are concerned about this risk, talk to your doctor about the benefits and risks of a CT scan and confirm that the test is needed. How long does the test take? · The test will take about 30 to 60 minutes. Most of this time is spent getting ready for the scan. The actual test only takes a few seconds. What happens after the test?  · Depending on the reason for the test, you will probably be able to go home right away. · If contrast material was used, drink lots of liquids for 24 hours after the test unless your doctor tells you not to. Follow-up care is a key part of your treatment and safety. Be sure to make and go to all appointments, and call your doctor if you are having problems. It's also a good idea to keep a list of the medicines you take. Ask your doctor when you can expect to have your test results. Where can you learn more? Go to http://melissa-carter.info/.   Enter Y394 in the search box to learn more about \"Computed Tomography (CT) Scan: About This Test.\"  Current as of: October 14, 2016  Content Version: 11.2  © 4439-2016 [employer]. Care instructions adapted under license by [employer] (which disclaims liability or warranty for this information). If you have questions about a medical condition or this instruction, always ask your healthcare professional. [address] any warranty or liability for your use of this information.

## 2017-05-08 NOTE — PROGRESS NOTES
ROOM # 17     Tadeo Bruce presents today for   Chief Complaint   Patient presents with    Hernia (Non Specific)       Tadeo Bruce preferred language for health care discussion is english/other. Is someone accompanying this pt? no    Is the patient using any DME equipment during OV? no    Depression Screening:  PHQ over the last two weeks 5/2/2017 12/8/2016 8/31/2016 6/14/2016 5/11/2016 1/5/2015 2/14/2014   Little interest or pleasure in doing things Not at all Not at all Not at all Not at all Not at all Not at all Not at all   Feeling down, depressed or hopeless Not at all Not at all Not at all Not at all Not at all Not at all Not at all   Total Score PHQ 2 0 0 0 0 0 0 0       Learning Assessment:  Learning Assessment 6/8/2015 11/5/2013   PRIMARY LEARNER Patient Patient   HIGHEST LEVEL OF EDUCATION - PRIMARY LEARNER  GRADUATED HIGH SCHOOL OR GED GRADUATED HIGH SCHOOL OR GED   PRIMARY [address]    NEED - No   LEARNER PREFERENCE PRIMARY DEMONSTRATION VIDEOS   LEARNING SPECIAL TOPICS - no   ANSWERED BY patient patient   RELATIONSHIP SELF SELF       Abuse Screening:  Abuse Screening Questionnaire 5/11/2016 6/8/2015 3/4/2015   Do you ever feel afraid of your partner? N N N   Are you in a relationship with someone who physically or mentally threatens you? N N N   Is it safe for you to go home? Loren Martinez       Fall Risk  Fall Risk Assessment, last 12 mths 5/2/2017 12/8/2016 8/31/2016 6/14/2016 5/11/2016 1/5/2015 2/14/2014   Able to walk? Yes Yes Yes Yes Yes Yes Yes   Fall in past 12 months? No No No No No No No       Health Maintenance reviewed and discussed per provider. Yes    Tadeo Bruce is due for eye exam, pt stated appt on Friday of this week. Please order/place referral if appropriate. Advance Directive:  1. Do you have an advance directive in place? Patient Reply: no    2. If not, would you like material regarding how to put one in place?  Patient Reply: no    Coordination of Care:  1. Have you been to the ER, urgent care clinic since your last visit? Hospitalized since your last visit? no    2. Have you seen or consulted any other health care providers outside of the [address] [place] [address] since your last visit? Include any pap smears or colon screening.  no

## 2017-05-08 NOTE — LETTER
5/8/2017 1:37 PM 
 
Mr. Gilford Gondola [phone] To Whom It May Concern: 
 
 
Please excuse the above patient from work the week of May 8, 2017 due his current medical condition as he has frequent appointments needed as well as increased pain with activity. Sincerely, Melody Motta MD

## 2017-05-10 PROBLEM — I10 UNCONTROLLED HYPERTENSION: Status: ACTIVE | Noted: 2017-05-10

## 2017-05-11 ENCOUNTER — PATIENT OUTREACH (OUTPATIENT)
Dept: INTERNAL MEDICINE CLINIC | Age: 77
End: 2017-05-11

## 2017-05-11 ENCOUNTER — TELEPHONE (OUTPATIENT)
Dept: INTERNAL MEDICINE CLINIC | Age: 77
End: 2017-05-11

## 2017-05-11 NOTE — LETTER
5/17/2017 1:35 PM 
 
Mr. Fly Glover [phone] Dear Abhi Nathan: 
 
I hope this letter finds you well. I am a Licensed Practical Nurse with [facility] and I have attempted to contact you by phone, but was unsuccessful. Your good health is important to us. As always, our goal is to be your partner in life-long wellness. Please contact our office at your earliest convenience. If you have any questions, please do not hesitate to give us a call at the number listed above. Sincerely, Ivet Singleton LPN

## 2017-05-11 NOTE — TELEPHONE ENCOUNTER
ANTONI Catalan. Patient's spouse contacted PCP office to inform PCP of patient's hopitalization 5/9/17 to [facility] for Bradycardia and kidney issues. Patient spouse  Would like to speak with Dr Milagro Catalan in regard to medications. Admitting physician changed medications and she has some questions.

## 2017-05-11 NOTE — TELEPHONE ENCOUNTER
Can you ask if she has discussed this with the hospital physician? There may be reasons these were changed due to his change in condition.

## 2017-05-11 NOTE — PROGRESS NOTES
Transitional Care Nurse Navigator Note:  Hospital Follow Up for Admission from [facility] 5/9/17 - 5/11/17    Per EMR due to:   Discharge diagnosis:  Near syncope sec to bradycardia   CKD stage IV  Ruled out Urinary tract infection. Hypertensive urgency   Type 2 diabetes. Coronary artery disease. Hyperlipidemia    Pt has appt scheduled with Dr Nathan Dove 5/16/17. NN outgoing call to patient. Not able to reach pt.

## 2017-05-12 ENCOUNTER — HOSPITAL ENCOUNTER (OUTPATIENT)
Dept: CT IMAGING | Age: 77
Discharge: HOME | End: 2017-05-12
Attending: INTERNAL MEDICINE
Payer: MEDICARE

## 2017-05-12 ENCOUNTER — PATIENT OUTREACH (OUTPATIENT)
Dept: INTERNAL MEDICINE CLINIC | Age: 77
End: 2017-05-12

## 2017-05-12 DIAGNOSIS — R10.31 INGUINAL PAIN, RIGHT: ICD-10-CM

## 2017-05-12 PROCEDURE — 74176 CT ABD & PELVIS W/O CONTRAST: CPT

## 2017-05-12 NOTE — PROGRESS NOTES
Transitional Care Nurse Navigator Note:  Hospital Follow Up for Admission [id number] [facility]. 5/9/17 - 5/11/17       NN outgoing call to patient. Not able to reach pt. Left message on voice mail for return call.

## 2017-05-13 NOTE — TELEPHONE ENCOUNTER
Attempted to contact pt at  number, no answer. Not able to leave a VM. Will continue to try to contact pt.

## 2017-05-15 NOTE — TELEPHONE ENCOUNTER
----- Message from Katie Conteh MD sent at 5/15/2017  8:04 AM EDT -----  Please let him know his CT showed a small fat containing right sided hernia, consistent with the US findings. If he is still having pain, I can enter a referral to surgery for further evaluation.

## 2017-05-15 NOTE — PROGRESS NOTES
Please let him know his CT showed a small fat containing right sided hernia, consistent with the US findings. If he is still having pain, I can enter a referral to surgery for further evaluation.

## 2017-05-15 NOTE — TELEPHONE ENCOUNTER
Unsuccessful attempt to reach pt for results. Left message for him to call back at his earliest convenience.

## 2017-05-16 ENCOUNTER — OFFICE VISIT (OUTPATIENT)
Dept: INTERNAL MEDICINE CLINIC | Age: 77
End: 2017-05-16

## 2017-05-16 VITALS
OXYGEN SATURATION: 99 % | SYSTOLIC BLOOD PRESSURE: 134 MMHG | DIASTOLIC BLOOD PRESSURE: 48 MMHG | WEIGHT: 148 LBS | BODY MASS INDEX: 23.23 KG/M2 | TEMPERATURE: 97.8 F | HEART RATE: 56 BPM | HEIGHT: 67 IN

## 2017-05-16 DIAGNOSIS — K40.90 UNILATERAL INGUINAL HERNIA WITHOUT OBSTRUCTION OR GANGRENE, RECURRENCE NOT SPECIFIED: ICD-10-CM

## 2017-05-16 DIAGNOSIS — I10 ESSENTIAL HYPERTENSION: Primary | ICD-10-CM

## 2017-05-16 DIAGNOSIS — E11.21 DIABETIC NEPHROPATHY ASSOCIATED WITH TYPE 2 DIABETES MELLITUS (HCC): ICD-10-CM

## 2017-05-16 LAB — GLUCOSE POC: 181 MG/DL

## 2017-05-16 RX ORDER — CLONIDINE HYDROCHLORIDE 0.1 MG/1
0.1 TABLET ORAL 2 TIMES DAILY
Qty: 60 TAB | Refills: 5 | Status: SHIPPED | OUTPATIENT
Start: 2017-05-16 | End: 2017-06-02 | Stop reason: SDUPTHER

## 2017-05-16 NOTE — MR AVS SNAPSHOT
Visit Information Date & Time Provider Department Dept. Phone Encounter #  
 5/16/2017  1:15 PM Ana Mejias, [id number] Follow-up Instructions Return in about 3 months (around 8/16/2017), or if symptoms worsen or fail to improve, for hypertension. Your Appointments 6/2/2017  8:00 AM  
Office Visit with MD TRICE Taveras [facility]) Appt Note: 1 month f/u HTN/pain [address]  
  
   
 [address] [facility] Upcoming Health Maintenance Date Due DTaP/Tdap/Td series (1 - Tdap) 1/29/1961 ZOSTER VACCINE AGE 60> 1/29/1999 EYE EXAM RETINAL OR DILATED Q1 4/28/2017 MEDICARE YEARLY EXAM 5/12/2017 Pneumococcal 65+ Low/Medium Risk (2 of 2 - PPSV23) 12/28/2017* INFLUENZA AGE 9 TO ADULT 8/1/2017 LIPID PANEL Q1 8/31/2017 HEMOGLOBIN A1C Q6M 10/20/2017 MICROALBUMIN Q1 12/6/2017 FOOT EXAM Q1 4/20/2018 GLAUCOMA SCREENING Q2Y 4/28/2018 COLONOSCOPY 7/8/2021 *Topic was postponed. The date shown is not the original due date. Allergies as of 5/16/2017  Review Complete On: 5/16/2017 By: Jeannie Parsons LPN Severity Noted Reaction Type Reactions Lisinopril  11/20/2014    Other (comments) Pt not allergic Current Immunizations  Reviewed on 11/15/2013 Name Date Influenza High Dose Vaccine PF 10/1/2015 Influenza Vaccine Split 11/1/2012 Pneumococcal Vaccine (Unspecified Type) 11/11/2005 Not reviewed this visit You Were Diagnosed With   
  
 Codes Comments Essential hypertension    -  Primary ICD-10-CM: I10 
ICD-9-CM: 401.9 Unilateral inguinal hernia without obstruction or gangrene, recurrence not specified     ICD-10-CM: K40.90 ICD-9-CM: 550.90 Vitals BP Pulse Temp Height(growth percentile) Weight(growth percentile) SpO2 134/48 (BP 1 Location: Left arm, BP Patient Position: Sitting) (!) 56 97.8 °F (36.6 °C) (Oral) 5' 7\" (1.702 m) 148 lb (67.1 kg) 99% BMI Smoking Status 23.18 kg/m2 Former Smoker Vitals History BMI and BSA Data Body Mass Index Body Surface Area  
 23.18 kg/m 2 1.78 m 2 Preferred Pharmacy Pharmacy Name Phone [address] [id number] [facility] [phone] Your Updated Medication List  
  
   
This list is accurate as of: 5/16/17  1:49 PM.  Always use your most recent med list. amLODIPine 10 mg tablet Commonly known as:  NORVASC  
take 1 tablet by mouth once daily  
  
 aspirin delayed-release 81 mg tablet Take 1 Tab by mouth daily. atorvastatin 80 mg tablet Commonly known as:  LIPITOR  
TAKE 1 TABLET BY MOUTH EVERY NIGHT AT BEDTIME  
  
 calcitRIOL 0.25 mcg capsule Commonly known as:  ROCALTROL TK 1 C PO D  
  
 CALCIUM 600 WITH VITAMIN D3 600 mg(1,500mg) -400 unit Cap Generic drug:  Calcium-Cholecalciferol (D3) Take 1 Cap by mouth daily. cloNIDine HCl 0.1 mg tablet Commonly known as:  CATAPRES Take 1 Tab by mouth two (2) times a day. Indications: hypertension  
  
 ferrous sulfate 325 mg (65 mg iron) tablet TK 1 T PO QOD  
  
 folic acid 1 mg tablet Commonly known as:  FOLVITE  
take 1 tablet by mouth once daily  
  
 furosemide 40 mg tablet Commonly known as:  LASIX 1qd Insulin Lisp & Lisp Prot (Hum) 100 unit/mL (75-25) Inpn Commonly known as:  HumaLOG Mix 75-25 KwikPen 10 units am and 8 units pm  
  
 isosorbide mononitrate ER 30 mg tablet Commonly known as:  IMDUR  
1qd  
  
 lisinopril 20 mg tablet Commonly known as:  Lionel Dance Take 2 Tabs by mouth daily. Rose Pen Needle 32 gauge x 5/32\" Ndle Generic drug:  Insulin Needles (Disposable) USE TWICE DAILY AS DIRECTED  
  
 omeprazole 20 mg capsule Commonly known as:  PRILOSEC Take 1 Cap by mouth daily. pentoxifylline  mg CR tablet Commonly known as:  TRENTAL TAKE 1 TABLET BY MOUTH THREE TIMES DAILY Prescriptions Sent to Pharmacy Refills  
 cloNIDine HCl (CATAPRES) 0.1 mg tablet 5 Sig: Take 1 Tab by mouth two (2) times a day. Indications: hypertension Class: Normal  
 [facility] [address] [facility] [address]  #: [phone] Route: Oral  
  
We Performed the Following REFERRAL TO GENERAL SURGERY [REF27 Custom] Comments:  
 Please evaluate patient for R inguinal pain, fat containing hernia on imaging. Please evaluate for treatment options given continued symptoms. Follow-up Instructions Return in about 3 months (around 8/16/2017), or if symptoms worsen or fail to improve, for hypertension. Referral Information Referral ID Referred By Referred To  
  
 [id number] Sadaf LLANOS MD [address] [facility] [address] [facility], [address] [place] [address] Phone: [phone] Fax: [phone] Visits Status Start Date End Date 1 New Request 5/16/17 5/16/18 If your referral has a status of pending review or denied, additional information will be sent to support the outcome of this decision. Introducing [facility]! 3 [facility] introduces [employer] patient portal. Now you can access parts of your medical record, email your doctor's office, and request medication refills online. 1. In your internet browser, go to [email or web address] 2. Click on the First Time User? Click Here link in the Sign In box. You will see the New Member Sign Up page. 3. Enter your [employer] Access Code exactly as it appears below. You will not need to use this code after youve completed the sign-up process. If you do not sign up before the expiration date, you must request a new code. · [employer] Access Code: [email or web address] Expires: 7/19/2017  8:26 AM 
 
4. Enter the last four digits of your Social Security Number (xxxx) and Date of Birth (mm/dd/yyyy) as indicated and click Submit. You will be taken to the next sign-up page. 5. Create a [employer] ID. This will be your [employer] login ID and cannot be changed, so think of one that is secure and easy to remember. 6. Create a [employer] password. You can change your password at any time. 7. Enter your Password Reset Question and Answer. This can be used at a later time if you forget your password. 8. Enter your e-mail address. You will receive e-mail notification when new information is available in [address] 9. Click Sign Up. You can now view and download portions of your medical record. 10. Click the Download Summary menu link to download a portable copy of your medical information. If you have questions, please visit the Frequently Asked Questions section of the [employer] website. Remember, [employer] is NOT to be used for urgent needs. For medical emergencies, dial 911. Now available from your iPhone and Android! Please provide this summary of care documentation to your next provider. Your primary care clinician is listed as Tera Mensah. If you have any questions after today's visit, please call [phone].

## 2017-05-16 NOTE — LETTER
NOTIFICATION RETURN TO WORK / SCHOOL 
 
5/16/2017 1:55 PM 
 
Mr. Valerie Lopez [phone] To Whom It May Concern: 
 
Valerie Lopez is currently under the care of Ryann Mensah. He will return to work/school on: Monday, June 22, 2017. If there are questions or concerns please have the patient contact our office. Sincerely, Katie Conteh MD

## 2017-05-16 NOTE — PROGRESS NOTES
HISTORY OF PRESENT ILLNESS  Chriss Sexton is a 65 y.o. male. HPI Comments: 66 yo male here for f/u from recent ED visit due to bradycardia and R inguinal pain. Recently started tramadol prn for pain. Had episode of feeling lightheaded and found to have bradycardia in 30s. Tramadol and coreg were discontinued and he was started on clonidine 0.3mg BID. Lisinopril was to be increased from 20 mg to 40 mg but he did not make this change. His wife reports that he has seemed more drowsy; fell asleep eating breakfast this morning. This has increased since starting clonidine. He was diagnosed and treated for UTI. Maybe some improvement of his R inguinal since this but still causing intermittent pain which makes him uncomfortable with walking. DM. He is concerned his glucometer is not accurate. 40 pt difference when compared with office POC. Review of Systems   Constitutional: Negative for chills and fever. HENT: Negative for congestion. Eyes: Negative for blurred vision and pain. Respiratory: Negative for cough and shortness of breath. Cardiovascular: Negative for chest pain, palpitations and leg swelling. Gastrointestinal: Positive for abdominal pain. Negative for heartburn, nausea and vomiting. Genitourinary: Negative for dysuria, flank pain, frequency, hematuria and urgency. Musculoskeletal: Negative for joint pain and myalgias. Neurological: Negative for tingling and headaches. Psychiatric/Behavioral: Negative for depression. The patient is not nervous/anxious.       Past Medical History:   Diagnosis Date    [facility]ARIO [facility]) [facility]     Anemia in chronic kidney disease     ASCVD (arteriosclerotic cardiovascular disease)     Benign hypertensive kidney disease     CKD (chronic kidney disease) stage 2, GFR 60-89 ml/min 4/18/2013    CKD (chronic kidney disease), stage III     Diabetes mellitus with chronic kidney disease ([facility]     Dialysis patient ([facility] 9/2014    Dyslipidemia  Heart attack ([facility] 9/2014    Hypertension     Left renal artery stenosis (HCC)     NSTEMI (non-ST elevated myocardial infarction) ([facility]     PAD (peripheral artery disease) (HCC)     PAF (paroxysmal atrial fibrillation) ([facility] 9/2014    Renal mass, right      Current Outpatient Prescriptions on File Prior to Visit   Medication Sig Dispense Refill    cloNIDine HCl (CATAPRES) 0.3 mg tablet Take 1 Tab by mouth two (2) times a day. 60 Tab 2    Calcium-Cholecalciferol, D3, (CALCIUM 600 WITH VITAMIN D3) 600 mg(1,500mg) -400 unit cap Take 1 Cap by mouth daily.  OCTAVIO PEN NEEDLE 32 gauge x 5/32\" ndle USE TWICE DAILY AS DIRECTED 200 Pen Needle 3    isosorbide mononitrate ER (IMDUR) 30 mg tablet 1qd 60 Tab 5    ferrous sulfate 325 mg (65 mg iron) tablet TK 1 T PO QOD  8    calcitRIOL (ROCALTROL) 0.25 mcg capsule TK 1 C PO D  9    atorvastatin (LIPITOR) 80 mg tablet TAKE 1 TABLET BY MOUTH EVERY NIGHT AT BEDTIME 30 Tab 11    folic acid (FOLVITE) 1 mg tablet take 1 tablet by mouth once daily 30 Tab 11    pentoxifylline CR (TRENTAL) 400 mg CR tablet TAKE 1 TABLET BY MOUTH THREE TIMES DAILY 90 Tab 11    amLODIPine (NORVASC) 10 mg tablet take 1 tablet by mouth once daily 60 Tab 5    omeprazole (PRILOSEC) 20 mg capsule Take 1 Cap by mouth daily. 60 Cap 5    aspirin delayed-release 81 mg tablet Take 1 Tab by mouth daily. 60 Tab 5    Insulin Lisp & Lisp Prot, Hum, (HUMALOG MIX 75-25 KWIKPEN) 100 unit/mL (75-25) inpn 10 units am and 8 units pm 3 Pen 3    furosemide (LASIX) 40 mg tablet 1qd 60 Tab 5    lisinopril (PRINIVIL, ZESTRIL) 20 mg tablet Take 2 Tabs by mouth daily. 30 Tab 2    traMADol (ULTRAM) 50 mg tablet 1 tab po q 12 hours if needed for severe pain  Indications: Pain 40 Tab 1     No current facility-administered medications on file prior to visit. Physical Exam   Constitutional: He appears well-developed and well-nourished. No distress.    /48 (BP 1 Location: Left arm, BP Patient Position: Sitting)  Pulse (!) 56  Temp 97.8 °F (36.6 °C) (Oral)   Ht 5' 7\" (1.702 m)  Wt 148 lb (67.1 kg)  SpO2 99%  BMI 23.18 kg/m2     Eyes: EOM are normal. Right eye exhibits no discharge. Left eye exhibits no discharge. No scleral icterus. Cardiovascular: Normal rate, regular rhythm and normal heart sounds. Exam reveals no gallop and no friction rub. No murmur heard. Pulmonary/Chest: Effort normal and breath sounds normal. No respiratory distress. He has no wheezes. He has no rales. Abdominal: Soft. He exhibits no distension. There is no tenderness. There is no rebound and no guarding. Musculoskeletal: He exhibits no edema or tenderness. Neurological: He is alert. He exhibits normal muscle tone. Skin: Skin is warm and dry. Psychiatric: He has a normal mood and affect. Lab Results   Component Value Date/Time    Sodium 136 05/10/2017 12:33 AM    Potassium 4.2 05/10/2017 12:33 AM    Chloride 101 05/10/2017 12:33 AM    CO2 25 05/10/2017 12:33 AM    Glucose 196 05/10/2017 12:33 AM    BUN 65 05/10/2017 12:33 AM    Creatinine 3.6 05/10/2017 12:33 AM    BUN/Creatinine ratio 18 03/04/2015 02:14 PM    GFR est AA 21.0 05/10/2017 12:33 AM    GFR est non-AA 18 05/10/2017 12:33 AM    Calcium 8.6 05/10/2017 12:33 AM    Bilirubin, total 0.6 08/21/2014 02:41 PM    AST (SGOT) 29 08/21/2014 02:41 PM    Alk. phosphatase 85 08/21/2014 02:41 PM    Protein, total 6.5 08/21/2014 02:41 PM    Albumin 3.0 04/25/2017 11:35 AM    A-G Ratio 1.6 08/21/2014 02:41 PM    ALT (SGPT) 36 08/21/2014 02:41 PM     CT Results (most recent):    Results from [place] encounter on 05/12/17   CT ABD PELV WO CONT   Narrative EXAM: CT of the abdomen and pelvis    INDICATION: 65-year-old patient with right inguinal pain. Possible  fat-containing inguinal hernia noted on prior sonogram of 5/5/2017. COMPARISON: Ultrasound 5/5/2017.     TECHNIQUE: Axial CT imaging of the abdomen and pelvis was performed without  intravenous contrast. Multiplanar reformats were generated. One or more dose reduction techniques were used on this CT: automated exposure  control, adjustment of the mAs and/or kVp according to patient's size, and  iterative reconstruction techniques. The specific techniques utilized on this CT  exam have been documented in the patient's electronic medical record.    _______________    FINDINGS:    LOWER CHEST: Cardiac size is mildly enlarged. Coronary arterial and mitral  calcifications are present. No pericardial effusion. LIVER, BILIARY: The unenhanced appearance of the liver demonstrates no focal  abnormality. Mild hepatic hypoattenuation noted. No biliary dilation. Gallbladder is unremarkable. PANCREAS: Normal unenhanced appearance. SPLEEN: Normal.    ADRENALS: Normal.    KIDNEYS/URETERS/BLADDER: No hydronephrosis involving either kidney. Bilateral  renal hilar vascular calcifications are present. Upper pole right renal cyst  measures approximately 1 cm in size. There is no evidence of distal ureteral or  bladder stone. Mild bladder wall thickening noted. PELVIC ORGANS: The prostate is enlarged, with indentation of the bladder base. There is a trace amount of pelvic fluid present. VASCULATURE: Advanced atherosclerotic calcification of the infrarenal abdominal  aorta as well as the proximal portions of the celiac, superior mesenteric,  bilateral renal arteries noted. Additional considerable atherosclerotic  calcification of the proximal inferior mesenteric artery. There is a left common  iliac arterial stent noted. Similarly diffuse bilateral iliac atherosclerotic  calcifications are noted. Surgical clips are present in the left inguinal  crease, likely from prior vascular intervention. LYMPH NODES: There are scattered subcentimeter mesenteric and retroperitoneal  lymph nodes. No abdominal or pelvic adenopathy.      GASTROINTESTINAL TRACT: The stomach, small intestine, and large intestine are  normal in course and caliber. No bowel obstruction. No free intraperitoneal gas. Normal appendix. Scattered colonic diverticula are noted, without evidence to  suggest diverticulitis. BONES: No acute or aggressive osseous abnormalities identified. Lower lumbar  facet osteoarthrosis noted. Mild bilateral hip joint osteoarthritis. OTHER: Small fat-containing right inguinal hernia noted. _______________         Impression IMPRESSION:    1. Small fat-containing right inguinal hernia. 2. No acute intra-abdominal or pelvic abnormality. 3. Normal appendix. Scattered colonic diverticula without evidence of  diverticulitis. 4. Mild prostatic enlargement with thickening of the bladder wall possibly  secondary to chronic outlet obstruction. 5. Increased attenuation of the hepatic parenchyma, nonspecific, as can be seen  in the context of amiodarone usage. 6. Mild cardiac enlargement without evidence of pericardial effusion. 7. Advanced atherosclerotic disease as above. ASSESSMENT and PLAN    ICD-10-CM ICD-9-CM    1. Essential hypertension I10 401.9    2. Unilateral inguinal hernia without obstruction or gangrene, recurrence not specified K40.90 550.90 REFERRAL TO GENERAL SURGERY   3. Diabetic nephropathy associated with type 2 diabetes mellitus (HCC) E11.21 250.40 AMB POC GLUCOSE BLOOD, BY GLUCOSE MONITORING DEVICE     583.81    Will decrease clonidine to 0.1mg BID as this may be contributing to his increased drowsiness. Increase lisinopril to 40 mg and monitor BP. Referral entered to surgery for further assessment of his inguinal pain. He will let us know his preferred glucometer as replacement is desired.

## 2017-05-16 NOTE — PROGRESS NOTES
Pt presents today for  [facility] F/U hypertension, patient discharge from [facility] on 5/11/17. Pt preferred language for health care discussion is english. Is someone accompanying this pt? Yes his wife    Is the patient using any DME equipment during OV? no    Depression Screening completed. Yes    Learning Assessment completed. yes    Abuse Screening completed. yes    Health Maintenance reviewed and discussed per provider. Yes    Advance Directive:  1. Do you have an advance directive in place? Patient Reply: No    Coordination of Care:  1. Have you been to the ER, urgent care clinic since your last visit? Hospitalized since your last visit? Yes     2. Have you seen or consulted any other health care providers outside of the [address] [place] [address] since your last visit? Include any pap smears or colon screening.  no

## 2017-05-16 NOTE — TELEPHONE ENCOUNTER
Pt informed of US results on 05/06/2017. See telephone encounter. Attempted to contact pt via mobile number regarding hospitalization, no answer. Lvm for pt to return call to office at [phone]. Will continue to try to contact pt.

## 2017-05-17 NOTE — TELEPHONE ENCOUNTER
Attempted to contact pt at  number, no answer. Lvm for pt to return call to office at [phone]. I have been unable to reach this patient by phone. A letter is being sent to the last known home address. Encounter will be closed.

## 2017-05-20 ENCOUNTER — OFFICE VISIT (OUTPATIENT)
Dept: INTERNAL MEDICINE CLINIC | Age: 77
End: 2017-05-20

## 2017-05-23 ENCOUNTER — TELEPHONE (OUTPATIENT)
Dept: INTERNAL MEDICINE CLINIC | Age: 77
End: 2017-05-23

## 2017-05-23 NOTE — TELEPHONE ENCOUNTER
Patient called stating that he had not heard from General Surgery since referral on 5/16. Patient also requesting information for employer to determine status. Patient currently utilizing Vacation days, employer evaluating whether patient will need short term disability time off.

## 2017-05-24 NOTE — TELEPHONE ENCOUNTER
Spoke with patient's HR department. Requested that [facility] paperwork be faxed to [phone]. Cynthia Wilson verbalized understanding, stated that she just needed a note from the pt's doctor stating that the patient would be out of work for an extended period.    Be advised

## 2017-05-25 NOTE — TELEPHONE ENCOUNTER
Attempted to contact Shereen Cazares was informed she is in a meeting. Left message for Shereen Cazares to return call to office at [phone].

## 2017-05-25 NOTE — TELEPHONE ENCOUNTER
I received a fax from his employer requesting I complete [place] paperwork, but did not receive anything else. Please clarify if just a letter is needed or [place] form.

## 2017-05-30 NOTE — TELEPHONE ENCOUNTER
Attempted to contact Kamila Case. Was transferred to Marybeth Hanna. 2 pt identifiers confirmed. States that the [facility] paperwork was sent by their corporate office and that is what is needed now not the letter. Marybeth Hanna is also asking that they be notified once [facility] paperwork is been submitted because it will be sent to corporate and they want to follow the progress. Dr Ankur Pantoja please be advised.

## 2017-06-01 NOTE — TELEPHONE ENCOUNTER
Cathie with HR contacted. 2 pt identifiers confirmed. Cathie informed of below per Dr Fanny Souza. Cathie verbalized understanding. No other questions or concerns at this time.

## 2017-06-02 ENCOUNTER — OFFICE VISIT (OUTPATIENT)
Dept: INTERNAL MEDICINE CLINIC | Age: 77
End: 2017-06-02

## 2017-06-02 VITALS
HEART RATE: 57 BPM | BODY MASS INDEX: 22.13 KG/M2 | WEIGHT: 141 LBS | DIASTOLIC BLOOD PRESSURE: 67 MMHG | RESPIRATION RATE: 16 BRPM | OXYGEN SATURATION: 99 % | SYSTOLIC BLOOD PRESSURE: 174 MMHG | TEMPERATURE: 96.7 F | HEIGHT: 67 IN

## 2017-06-02 DIAGNOSIS — Z01.818 PREOP EXAMINATION: ICD-10-CM

## 2017-06-02 DIAGNOSIS — I10 ESSENTIAL HYPERTENSION: ICD-10-CM

## 2017-06-02 DIAGNOSIS — Z00.00 ROUTINE GENERAL MEDICAL EXAMINATION AT A HEALTH CARE FACILITY: Primary | ICD-10-CM

## 2017-06-02 RX ORDER — CLONIDINE HYDROCHLORIDE 0.1 MG/1
TABLET ORAL
Qty: 90 TAB | Refills: 5
Start: 2017-06-02 | End: 2017-06-13

## 2017-06-02 NOTE — MR AVS SNAPSHOT
Visit Information Date & Time Provider Department Dept. Phone Encounter #  
 6/2/2017  2:15 PM [facility] [phone] [id number] Upcoming Health Maintenance Date Due  
 MEDICARE YEARLY EXAM 5/12/2017 Pneumococcal 65+ Low/Medium Risk (2 of 2 - PPSV23) 12/28/2017* INFLUENZA AGE 9 TO ADULT 8/1/2017 LIPID PANEL Q1 8/31/2017 HEMOGLOBIN A1C Q6M 10/20/2017 MICROALBUMIN Q1 12/6/2017 FOOT EXAM Q1 4/20/2018 EYE EXAM RETINAL OR DILATED Q1 5/18/2018 GLAUCOMA SCREENING Q2Y 5/18/2019 COLONOSCOPY 7/8/2021 DTaP/Tdap/Td series (2 - Td) 6/2/2027 *Topic was postponed. The date shown is not the original due date. Allergies as of 6/2/2017  Review Complete On: 6/2/2017 By: Toby Hollins LPN Severity Noted Reaction Type Reactions Lisinopril  11/20/2014    Other (comments) Pt not allergic Current Immunizations  Reviewed on 11/15/2013 Name Date Influenza High Dose Vaccine PF 10/1/2015 Influenza Vaccine Split 11/1/2012 Pneumococcal Vaccine (Unspecified Type) 11/11/2005 Not reviewed this visit You Were Diagnosed With   
  
 Codes Comments Routine general medical examination at a health care facility    -  Primary ICD-10-CM: Z00.00 ICD-9-CM: V70.0 Essential hypertension     ICD-10-CM: I10 
ICD-9-CM: 401.9 Preop examination     ICD-10-CM: R52.801 ICD-9-CM: V72.84 Vitals BP Pulse Temp Resp Height(growth percentile) Weight(growth percentile) 174/67 (BP 1 Location: Left arm, BP Patient Position: Sitting) (!) 57 96.7 °F (35.9 °C) (Oral) 16 5' 7\" (1.702 m) 141 lb (64 kg) SpO2 BMI Smoking Status 99% 22.08 kg/m2 Former Smoker Vitals History BMI and BSA Data Body Mass Index Body Surface Area 22.08 kg/m 2 1.74 m 2 Preferred Pharmacy Pharmacy Name Phone  [address] [address] [facility] [place] [facility] Your Updated Medication List  
  
   
This list is accurate as of: 6/2/17  2:55 PM.  Always use your most recent med list. amLODIPine 10 mg tablet Commonly known as:  NORVASC  
take 1 tablet by mouth once daily  
  
 aspirin delayed-release 81 mg tablet Take 1 Tab by mouth daily. atorvastatin 80 mg tablet Commonly known as:  LIPITOR  
TAKE 1 TABLET BY MOUTH EVERY NIGHT AT BEDTIME  
  
 calcitRIOL 0.25 mcg capsule Commonly known as:  ROCALTROL TK 1 C PO D  
  
 CALCIUM 600 WITH VITAMIN D3 600 mg(1,500mg) -400 unit Cap Generic drug:  Calcium-Cholecalciferol (D3) Take 1 Cap by mouth daily. cloNIDine HCl 0.1 mg tablet Commonly known as:  CATAPRES  
1 tab po every 8 hours. Indications: hypertension  
  
 ferrous sulfate 325 mg (65 mg iron) tablet TK 1 T PO QOD  
  
 folic acid 1 mg tablet Commonly known as:  FOLVITE  
take 1 tablet by mouth once daily  
  
 furosemide 40 mg tablet Commonly known as:  LASIX 1qd Insulin Lisp & Lisp Prot (Hum) 100 unit/mL (75-25) Inpn Commonly known as:  HumaLOG Mix 75-25 KwikPen 10 units am and 8 units pm  
  
 isosorbide mononitrate ER 30 mg tablet Commonly known as:  IMDUR  
1qd  
  
 lisinopril 20 mg tablet Commonly known as:  Alessia Feil Take 2 Tabs by mouth daily. Rose Pen Needle 32 gauge x 5/32\" Ndle Generic drug:  Insulin Needles (Disposable) USE TWICE DAILY AS DIRECTED  
  
 omeprazole 20 mg capsule Commonly known as:  PRILOSEC Take 1 Cap by mouth daily. pentoxifylline  mg CR tablet Commonly known as:  TRENTAL TAKE 1 TABLET BY MOUTH THREE TIMES DAILY Patient Instructions Advance Care Planning: Care Instructions Your Care Instructions It can be hard to live with an illness that cannot be cured.  But if your health is getting worse, you may want to make decisions about end-of-life care. Planning for the end of your life does not mean that you are giving up. It is a way to make sure that your wishes are met. Clearly stating your wishes can make it easier for your loved ones. Making plans while you are still able may also ease your mind and make your final days less stressful and more meaningful. Follow-up care is a key part of your treatment and safety. Be sure to make and go to all appointments, and call your doctor if you are having problems. It's also a good idea to know your test results and keep a list of the medicines you take. What can you do to plan for the end of life? · You can bring these issues up with your doctor. You do not need to wait until your doctor starts the conversation. You might start with \"I would not be willing to live with . Tita Todd \" When you complete this sentence it helps your doctor understand your wishes. · Talk openly and honestly with your doctor. This is the best way to understand the decisions you will need to make as your health changes. Know that you can always change your mind. · Ask your doctor about commonly used life-support measures. These include tube feedings, breathing machines, and fluids given through a vein (IV). Understanding these treatments will help you decide whether you want them. · You may choose to have these life-supporting treatments for a limited time. This allows a trial period to see whether they will help you. You may also decide that you want your doctor to take only certain measures to keep you alive. It is important to spell out these conditions so that your doctor and family understand them. · Talk to your doctor about how long you are likely to live. He or she may be able to give you an idea of what usually happens with your specific illness. · Think about preparing papers that state your wishes. This way there will not be any confusion about what you want. You can change your instructions at any time. Which papers should you prepare? Advance directives are legal papers that tell doctors how you want to be cared for at the end of your life. You do not need a  to write these papers. Ask your doctor or your state health department for information on how to write your advance directives. They may have the forms for each of these types of papers. Make sure your doctor has a copy of these on file, and give a copy to a family member or close friend. · Consider a do-not-resuscitate order (DNR). This order asks that no extra treatments be done if your heart stops or you stop breathing. Extra treatments may include cardiopulmonary resuscitation (CPR), electrical shock to restart your heart, or a machine to breathe for you. If you decide to have a DNR order, ask your doctor to explain and write it. Place the order in your home where everyone can easily see it. · Consider a living will. A living will explains your wishes about life support and other treatments at the end of your life if you become unable to speak for yourself. Living evans tell doctors to use or not use treatments that would keep you alive. You must have one or two witnesses or a notary present when you sign this form. · Consider a durable power of  for health care. This allows you to name a person to make decisions about your care if you are not able to. Most people ask a close friend or family member. Talk to this person about the kinds of treatments you want and those that you do not want. Make sure this person understands your wishes. These legal papers are simple to change. Tell your doctor what you want to change, and ask him or her to make a note in your medical file. Give your family updated copies of the papers. Where can you learn more? Go to http://[email or web address]-carter.info/. Enter P184 in the search box to learn more about \"Advance Care Planning: Care Instructions. \" Current as of: November 17, 2016 Content Version: 11.2 © 2352-7749 [employer]. Care instructions adapted under license by [employer] (which disclaims liability or warranty for this information). If you have questions about a medical condition or this instruction, always ask your healthcare professional. [address] any warranty or liability for your use of this information. Introducing [facility]! [facility] introduces [employer] patient portal. Now you can access parts of your medical record, email your doctor's office, and request medication refills online. 1. In your internet browser, go to [email or web address] 2. Click on the First Time User? Click Here link in the Sign In box. You will see the New Member Sign Up page. 3. Enter your [employer] Access Code exactly as it appears below. You will not need to use this code after youve completed the sign-up process. If you do not sign up before the expiration date, you must request a new code. · [employer] Access Code: [email or web address] Expires: 7/19/2017  8:26 AM 
 
4. Enter the last four digits of your Social Security Number (xxxx) and Date of Birth (mm/dd/yyyy) as indicated and click Submit. You will be taken to the next sign-up page. 5. Create a [employer]umi ID. This will be your [employer] login ID and cannot be changed, so think of one that is secure and easy to remember. 6. Create a [employer] password. You can change your password at any time. 7. Enter your Password Reset Question and Answer. This can be used at a later time if you forget your password. 8. Enter your e-mail address. You will receive e-mail notification when new information is available in [address] 9. Click Sign Up. You can now view and download portions of your medical record. 10. Click the Download Summary menu link to download a portable copy of your medical information. If you have questions, please visit the Frequently Asked Questions section of the [employer] website. Remember, [employer] is NOT to be used for urgent needs. For medical emergencies, dial 911. Now available from your iPhone and Android! Please provide this summary of care documentation to your next provider. Your primary care clinician is listed as Tera Mensah. If you have any questions after today's visit, please call [phone].

## 2017-06-02 NOTE — PROGRESS NOTES
HISTORY OF PRESENT ILLNESS  Mary Coles is a 65 y.o. male. HPI Comments: 64 yo male here for [address], f/u of HTN, preop evaluation for hernia repair planned in 2 weeks. Decreased clonidine from 0.3mg BID to 0.1 mg BID last visit due to feeling overly tired on this. Sx improved but BP elevated. Not as bad at home where it ranges 140-150. Denies CP, SOB, HA. Functionally > 4 METS prior to his groin pain. Able to 'run' up the stairs. Does have h/o MI. Last echo this year stable. RCRI = 3 with insulin requiring DM, creatinine > 2, h/o MI. He was asked to schedule f/u with his cardiologist by his surgeon per pt. Review of Systems   Constitutional: Negative for chills, fever and weight loss. HENT: Negative for congestion. Eyes: Negative for blurred vision and pain. Respiratory: Negative for cough and shortness of breath. Cardiovascular: Negative for chest pain, palpitations and leg swelling. Gastrointestinal: Negative for heartburn, nausea and vomiting. Musculoskeletal: Negative for joint pain and myalgias. Neurological: Negative for dizziness, tingling and headaches. Psychiatric/Behavioral: Negative for depression. The patient is not nervous/anxious.       Past Medical History:   Diagnosis Date    NAZARIO (acute kidney injury) ([address]     Anemia in chronic kidney disease     ASCVD (arteriosclerotic cardiovascular disease)     Benign hypertensive kidney disease     CKD (chronic kidney disease) stage 2, GFR 60-89 ml/min 4/18/2013    CKD (chronic kidney disease), stage III     Diabetes mellitus with chronic kidney disease ([address]     Dialysis patient ([address] 9/2014    Dyslipidemia     Heart attack ([address] 9/2014    Hypertension     Left renal artery stenosis (HCC)     NSTEMI (non-ST elevated myocardial infarction) ([address]     PAD (peripheral artery disease) ([address]     PAF (paroxysmal atrial fibrillation) ([address] 9/2014    Renal mass, right      Current Outpatient Prescriptions on File Prior to Visit   Medication Sig Dispense Refill    cloNIDine HCl (CATAPRES) 0.1 mg tablet Take 1 Tab by mouth two (2) times a day. Indications: hypertension 60 Tab 5    lisinopril (PRINIVIL, ZESTRIL) 20 mg tablet Take 2 Tabs by mouth daily. 30 Tab 2    Calcium-Cholecalciferol, D3, (CALCIUM 600 WITH VITAMIN D3) 600 mg(1,500mg) -400 unit cap Take 1 Cap by mouth daily.  OCTAVIO PEN NEEDLE 32 gauge x 5/32\" ndle USE TWICE DAILY AS DIRECTED 200 Pen Needle 3    isosorbide mononitrate ER (IMDUR) 30 mg tablet 1qd 60 Tab 5    ferrous sulfate 325 mg (65 mg iron) tablet TK 1 T PO QOD  8    calcitRIOL (ROCALTROL) 0.25 mcg capsule TK 1 C PO D  9    atorvastatin (LIPITOR) 80 mg tablet TAKE 1 TABLET BY MOUTH EVERY NIGHT AT BEDTIME 30 Tab 11    folic acid (FOLVITE) 1 mg tablet take 1 tablet by mouth once daily 30 Tab 11    pentoxifylline CR (TRENTAL) 400 mg CR tablet TAKE 1 TABLET BY MOUTH THREE TIMES DAILY 90 Tab 11    amLODIPine (NORVASC) 10 mg tablet take 1 tablet by mouth once daily 60 Tab 5    omeprazole (PRILOSEC) 20 mg capsule Take 1 Cap by mouth daily. 60 Cap 5    aspirin delayed-release 81 mg tablet Take 1 Tab by mouth daily. 60 Tab 5    Insulin Lisp & Lisp Prot, Hum, (HUMALOG MIX 75-25 KWIKPEN) 100 unit/mL (75-25) inpn 10 units am and 8 units pm 3 Pen 3    furosemide (LASIX) 40 mg tablet 1qd 60 Tab 5     No current facility-administered medications on file prior to visit. Physical Exam   Constitutional: He appears well-developed and well-nourished. No distress. /67 (BP 1 Location: Left arm, BP Patient Position: Sitting)  Pulse (!) 57  Temp 96.7 °F (35.9 °C) (Oral)   Resp 16  Ht 5' 7\" (1.702 m)  Wt 141 lb (64 kg)  SpO2 99%  BMI 22.08 kg/m2     Eyes: EOM are normal. Right eye exhibits no discharge. Left eye exhibits no discharge. No scleral icterus. Neck: Neck supple. Cardiovascular: Normal rate, regular rhythm and normal heart sounds. Exam reveals no gallop and no friction rub.     No murmur heard.  Pulmonary/Chest: Effort normal and breath sounds normal. No respiratory distress. He has no wheezes. He has no rales. Musculoskeletal: He exhibits no edema or tenderness. Lymphadenopathy:     He has no cervical adenopathy. Neurological: He is alert. He exhibits normal muscle tone. Skin: Skin is warm and dry. Psychiatric: He has a normal mood and affect. Lab Results   Component Value Date/Time    Sodium 136 05/10/2017 12:33 AM    Potassium 4.2 05/10/2017 12:33 AM    Chloride 101 05/10/2017 12:33 AM    CO2 25 05/10/2017 12:33 AM    Glucose 196 05/10/2017 12:33 AM    BUN 65 05/10/2017 12:33 AM    Creatinine 3.6 05/10/2017 12:33 AM    BUN/Creatinine ratio 18 03/04/2015 02:14 PM    GFR est AA 21.0 05/10/2017 12:33 AM    GFR est non-AA 18 05/10/2017 12:33 AM    Calcium 8.6 05/10/2017 12:33 AM    Bilirubin, total 0.6 08/21/2014 02:41 PM    AST (SGOT) 29 08/21/2014 02:41 PM    Alk. phosphatase 85 08/21/2014 02:41 PM    Protein, total 6.5 08/21/2014 02:41 PM    Albumin 3.0 04/25/2017 11:35 AM    A-G Ratio 1.6 08/21/2014 02:41 PM    ALT (SGPT) 36 08/21/2014 02:41 PM     Lab Results   Component Value Date/Time    WBC 5.9 05/10/2017 12:33 AM    HGB 10.2 05/10/2017 12:33 AM    HCT 30.2 05/10/2017 12:33 AM    PLATELET 227 13/57/0265 12:33 AM    MCV 82.7 05/10/2017 12:33 AM       ASSESSMENT and PLAN    ICD-10-CM ICD-9-CM    1. Routine general medical examination at a health care facility Z00.00 V70.0    2. Essential hypertension I10 401.9    3. Preop examination Z01.818 V72.84    Will try increasing clonidine to 0.1 mg TID. If BP remains elevated, can try taking 0.2mg BID. Discussed increased risk of surgery based on RCRI, though he does have good functional status.  Encouraged to f/u with his cardiologist.

## 2017-06-02 NOTE — PROGRESS NOTES
This is a Subsequent Medicare Annual Wellness Visit providing Personalized Prevention Plan Services (PPPS) (Performed 12 months after initial AWV and PPPS )    I have reviewed the patient's medical history in detail and updated the computerized patient record. History   66 yo male here for [address] Feels well except for groin pain, surgery for hernia planned for later this month. BP elevated today but he feels better since decreasing clonidine dose. Independent in IADLs. Lives with his wife. Past Medical History:   Diagnosis Date    [facility]     Anemia in chronic kidney disease     ASCVD (arteriosclerotic cardiovascular disease)     Benign hypertensive kidney disease     CKD (chronic kidney disease) stage 2, GFR 60-89 ml/min 4/18/2013    CKD (chronic kidney disease), stage III     Diabetes mellitus with chronic kidney disease [facility]     Dialysis patient ([facility] 9/2014    Dyslipidemia     Heart attack ([facility] 9/2014    Hypertension     Left renal artery stenosis (HCC)     NSTEMI (non-ST elevated myocardial infarction) [facility]     PAD (peripheral artery disease) ([facility]     PAF (paroxysmal atrial fibrillation) ([facility] 9/2014    Renal mass, right       Past Surgical History:   Procedure Laterality Date    CARDIAC SURG PROCEDURE UNLIST  9/2014     Current Outpatient Prescriptions   Medication Sig Dispense Refill    cloNIDine HCl (CATAPRES) 0.1 mg tablet Take 1 Tab by mouth two (2) times a day. Indications: hypertension 60 Tab 5    lisinopril (PRINIVIL, ZESTRIL) 20 mg tablet Take 2 Tabs by mouth daily. 30 Tab 2    Calcium-Cholecalciferol, D3, (CALCIUM 600 WITH VITAMIN D3) 600 mg(1,500mg) -400 unit cap Take 1 Cap by mouth daily.       OCTAVIO PEN NEEDLE 32 gauge x 5/32\" ndle USE TWICE DAILY AS DIRECTED 200 Pen Needle 3    isosorbide mononitrate ER (IMDUR) 30 mg tablet 1qd 60 Tab 5    ferrous sulfate 325 mg (65 mg iron) tablet TK 1 T PO QOD  8    calcitRIOL (ROCALTROL) 0.25 mcg capsule TK 1 C PO D  9    atorvastatin (LIPITOR) 80 mg tablet TAKE 1 TABLET BY MOUTH EVERY NIGHT AT BEDTIME 30 Tab 11    folic acid (FOLVITE) 1 mg tablet take 1 tablet by mouth once daily 30 Tab 11    pentoxifylline CR (TRENTAL) 400 mg CR tablet TAKE 1 TABLET BY MOUTH THREE TIMES DAILY 90 Tab 11    amLODIPine (NORVASC) 10 mg tablet take 1 tablet by mouth once daily 60 Tab 5    omeprazole (PRILOSEC) 20 mg capsule Take 1 Cap by mouth daily. 60 Cap 5    aspirin delayed-release 81 mg tablet Take 1 Tab by mouth daily. 60 Tab 5    Insulin Lisp & Lisp Prot, Hum, (HUMALOG MIX 75-25 KWIKPEN) 100 unit/mL (75-25) inpn 10 units am and 8 units pm 3 Pen 3    furosemide (LASIX) 40 mg tablet 1qd 60 Tab 5     Allergies   Allergen Reactions    Lisinopril Other (comments)     Pt not allergic     Family History   Problem Relation Age of Onset    Diabetes Mother     Cancer Father     Hypertension Sister     Diabetes Brother      Social History   Substance Use Topics    Smoking status: Former Smoker     Years: 15.00     Types: Cigarettes    Smokeless tobacco: Never Used    Alcohol use No     Patient Active Problem List   Diagnosis Code    Hypertension I10    Diabetes mellitus ([facility] E11.9    Pure hypercholesterolemia E78.00    Anxiety F41.9    CKD (chronic kidney disease) stage 2, GFR 60-89 ml/min N18.2    Renal lesion N28.9    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 Requires assistance with: no ADLs    Fall Risk     Fall Risk Assessment, last 12 mths 6/2/2017   Able to walk?  Yes   Fall in past 12 months? No     Abuse Screen   Patient is not abused    Review of Systems   A comprehensive review of systems was negative except for that written in the HPI. Physical Examination     Evaluation of Cognitive Function:  Mood/affect:  happy  Appearance: age appropriate  Family member/caregiver input: wife    Visit Vitals    /67 (BP 1 Location: Left arm, BP Patient Position: Sitting)    Pulse (!) 57    Temp 96.7 °F (35.9 °C) (Oral)    Resp 16    Ht 5' 7\" (1.702 m)    Wt 141 lb (64 kg)    SpO2 99%    BMI 22.08 kg/m2     General appearance: alert, cooperative, no distress, appears stated age  Lungs: clear to auscultation bilaterally  Heart: regular rate and rhythm, S1, S2 normal, no murmur, click, rub or gallop  Extremities: extremities normal, atraumatic, no cyanosis or edema    Patient Care Team:  Marzena Gutierrez MD as PCP - General (Internal Medicine)  Román Boyd, RN as Nurse Navigator  Dhiraj Young MD (Nephrology)  Kirk Rivas MD (Ophthalmology)       Advice/Referrals/Counseling   Education and counseling provided:  End-of-Life planning (with patient's consent)      Assessment/Plan       ICD-10-CM ICD-9-CM    1. Routine general medical examination at a health care facility Z00.00 V70.0    2. Essential hypertension I10 401.9    3. Preop examination Z01.818 V72.84    . Discussed ACP and provided paperwork on this. He would want his wife and daughter to act as surrogate decision makers for him if necessary.

## 2017-06-02 NOTE — PATIENT INSTRUCTIONS
Advance Care Planning: Care Instructions  Your Care Instructions  It can be hard to live with an illness that cannot be cured. But if your health is getting worse, you may want to make decisions about end-of-life care. Planning for the end of your life does not mean that you are giving up. It is a way to make sure that your wishes are met. Clearly stating your wishes can make it easier for your loved ones. Making plans while you are still able may also ease your mind and make your final days less stressful and more meaningful. Follow-up care is a key part of your treatment and safety. Be sure to make and go to all appointments, and call your doctor if you are having problems. It's also a good idea to know your test results and keep a list of the medicines you take. What can you do to plan for the end of life? · You can bring these issues up with your doctor. You do not need to wait until your doctor starts the conversation. You might start with \"I would not be willing to live with . Roberto Carlos Wiggins \" When you complete this sentence it helps your doctor understand your wishes. · Talk openly and honestly with your doctor. This is the best way to understand the decisions you will need to make as your health changes. Know that you can always change your mind. · Ask your doctor about commonly used life-support measures. These include tube feedings, breathing machines, and fluids given through a vein (IV). Understanding these treatments will help you decide whether you want them. · You may choose to have these life-supporting treatments for a limited time. This allows a trial period to see whether they will help you. You may also decide that you want your doctor to take only certain measures to keep you alive. It is important to spell out these conditions so that your doctor and family understand them. · Talk to your doctor about how long you are likely to live.  He or she may be able to give you an idea of what usually happens with your specific illness. · Think about preparing papers that state your wishes. This way there will not be any confusion about what you want. You can change your instructions at any time. Which papers should you prepare? Advance directives are legal papers that tell doctors how you want to be cared for at the end of your life. You do not need a  to write these papers. Ask your doctor or your state health department for information on how to write your advance directives. They may have the forms for each of these types of papers. Make sure your doctor has a copy of these on file, and give a copy to a family member or close friend. · Consider a do-not-resuscitate order (DNR). This order asks that no extra treatments be done if your heart stops or you stop breathing. Extra treatments may include cardiopulmonary resuscitation (CPR), electrical shock to restart your heart, or a machine to breathe for you. If you decide to have a DNR order, ask your doctor to explain and write it. Place the order in your home where everyone can easily see it. · Consider a living will. A living will explains your wishes about life support and other treatments at the end of your life if you become unable to speak for yourself. Living evans tell doctors to use or not use treatments that would keep you alive. You must have one or two witnesses or a notary present when you sign this form. · Consider a durable power of  for health care. This allows you to name a person to make decisions about your care if you are not able to. Most people ask a close friend or family member. Talk to this person about the kinds of treatments you want and those that you do not want. Make sure this person understands your wishes. These legal papers are simple to change. Tell your doctor what you want to change, and ask him or her to make a note in your medical file. Give your family updated copies of the papers.   Where can you learn more?  Go to http://[email or web address]-carter.info/. Enter P184 in the search box to learn more about \"Advance Care Planning: Care Instructions. \"  Current as of: November 17, 2016  Content Version: 11.2  © 5389-6510 [employer]. Care instructions adapted under license by [employer] (which disclaims liability or warranty for this information). If you have questions about a medical condition or this instruction, always ask your healthcare professional. [address] any warranty or liability for your use of this information.

## 2017-06-12 ENCOUNTER — TELEPHONE (OUTPATIENT)
Dept: INTERNAL MEDICINE CLINIC | Age: 77
End: 2017-06-12

## 2017-06-12 NOTE — TELEPHONE ENCOUNTER
According to Dr. Heaven Iglesias note on 6/2/17, it says:   \"Will try increasing clonidine to 0.1 mg TID. If BP remains elevated, can try taking 0.2mg BID. \" Stop taking the clonidine, norvasc, and lisinopril. He needs to be seen. How is his BP and pulse now? Is he having any AMS, dizziness, chest pain, shortness of breath, blurred vision?

## 2017-06-12 NOTE — TELEPHONE ENCOUNTER
Called pt 2 pt identifiers confirmed informed pt to hold hus BP medications, pt sched for appt tomorrow with Dr Patria Solorio at 0800 pt confirmed appt time also informed pt to bring in all of his medications including all the bottles of medication he may have on hand that he isnt taking. Pt stated understanding no other questions or concerns noted at this time.

## 2017-06-12 NOTE — TELEPHONE ENCOUNTER
Patient asked neighbor, Marcie Parks, who is a nurse to come over and take blood pressure. Nurse took blood pressure 90/36 and 100/38 manual B/p with pulse of 34. Patient reports change in clonidine to .3 once per day possibly twice per day. Patient requests call back to speak with staff.

## 2017-06-12 NOTE — TELEPHONE ENCOUNTER
Pt contacted at home number. Spoke with pt, pt wife and pt neighbor, permission given per pt.  2 pt identifiers confirmed. Pt neighbor is an RN and was asked to come over this morning by pt wife because pt was feeling weak and sluggish. Pt neighbor states she checked pt BP and it initially 90/36 and his pulse was in low 30's. She states pt was not responding well and appeared sluggish. States she remained with pt for most of the day and rechecked BP. It has gone up, it was last checked around 2 pm and it was 110/42, she states his pulse has remained weak but it has gone up to upper 30's. Upon investigating pt medications she learned that pt was confused regarding changes to Clonidine by Dr Tahmina Biggs on 06/02/2017. Pt has been taking 0.1 mg three times per day instead of two times per day, directions on bottle state pt should be taking two times per day. Pt neighbor also believes pt may have mistakenly been taking 0.3 mg of Clonidine BID. Pt neighbor states she told pt and pt wife to throw out the 0.3 mg so there is no confusion. Pt informed to hold Clonidine for tonight or until MD is made aware regarding pt condition. Dr Tahmina Biggs please advise.

## 2017-06-13 ENCOUNTER — OFFICE VISIT (OUTPATIENT)
Dept: INTERNAL MEDICINE CLINIC | Age: 77
End: 2017-06-13

## 2017-06-13 VITALS
SYSTOLIC BLOOD PRESSURE: 172 MMHG | DIASTOLIC BLOOD PRESSURE: 71 MMHG | WEIGHT: 138 LBS | TEMPERATURE: 98.5 F | HEART RATE: 76 BPM | BODY MASS INDEX: 21.66 KG/M2 | OXYGEN SATURATION: 100 % | RESPIRATION RATE: 16 BRPM | HEIGHT: 67 IN

## 2017-06-13 DIAGNOSIS — I10 ESSENTIAL HYPERTENSION: Primary | ICD-10-CM

## 2017-06-13 RX ORDER — HYDRALAZINE HYDROCHLORIDE 10 MG/1
10 TABLET, FILM COATED ORAL 3 TIMES DAILY
Qty: 90 TAB | Refills: 5 | Status: SHIPPED | OUTPATIENT
Start: 2017-06-13 | End: 2017-11-03 | Stop reason: DRUGHIGH

## 2017-06-13 NOTE — PATIENT INSTRUCTIONS
Please stop taking clonidine and start taking hydralazine 10 mg three times daily. Please let me know if any side effects occur. Hydralazine (By mouth)   Hydralazine Hydrochloride (dlo-DEAK-a-dianne cyrus-droe-KLOR-clem)  Treats high blood pressure. Brand Name(s):   There may be other brand names for this medicine. When This Medicine Should Not Be Used: This medicine is not right for everyone. Do not use it if you had an allergic reaction to hydralazine, or if you have coronary artery disease or rheumatic heart disease. How to Use This Medicine:   Tablet  · Take your medicine as directed. Your dose may need to be changed several times to find what works best for you. · Missed dose: Take a dose as soon as you remember. If it is almost time for your next dose, wait until then and take a regular dose. Do not take extra medicine to make up for a missed dose. · Store the medicine in a closed container at room temperature, away from heat, moisture, and direct light. Drugs and Foods to Avoid:   Ask your doctor or pharmacist before using any other medicine, including over-the-counter medicines, vitamins, and herbal products. · Some foods and medicines can affect how hydralazine works. Tell your doctor if you are using diazoxide or an MAO inhibitor. Warnings While Using This Medicine:   · Tell your doctor if you are pregnant or breastfeeding, or if you have kidney disease, heart or blood vessel disease, heart rhythm problems, lupus, or if you had a heart attack or stroke. · This medicine may cause the following problems:  ¨ Lupus-like syndrome  ¨ Changes in heart rhythm  ¨ Nerve problems  · This medicine may lower your blood pressure too much and cause you to feel dizzy. Do not drive or do anything else that could be dangerous until you know how this medicine affects you. · Your doctor will do lab tests at regular visits to check on the effects of this medicine. Keep all appointments.   · Keep all medicine out of the reach of children. Never share your medicine with anyone. Possible Side Effects While Using This Medicine:   Call your doctor right away if you notice any of these side effects:  · Allergic reaction: Itching or hives, swelling in your face or hands, swelling or tingling in your mouth or throat, chest tightness, trouble breathing  · Change in how much or how often you urinate  · Chest pain that may spread to your arms, jaw, back, or neck, trouble breathing, unusual sweating, faintness  · Fast, pounding, or uneven heartbeat  · Fever, chills, cough, sore throat, and body aches  · Lightheadedness, dizziness, or fainting  · Numbness, tingling, or burning pain in your hands, arms, legs, or feet  · Unusual bleeding, bruising, or weakness  If you notice these less serious side effects, talk with your doctor:   · Diarrhea, nausea, vomiting, loss of appetite  · Headache  · Stuffy nose or watery eyes  If you notice other side effects that you think are caused by this medicine, tell your doctor. Call your doctor for medical advice about side effects. You may report side effects to FDA at 6-008-FDA-1309  © 2017 [address] Information is for End User's use only and may not be sold, redistributed or otherwise used for commercial purposes. The above information is an  only. It is not intended as medical advice for individual conditions or treatments. Talk to your doctor, nurse or pharmacist before following any medical regimen to see if it is safe and effective for you.

## 2017-06-13 NOTE — MR AVS SNAPSHOT
Visit Information Date & Time Provider Department Dept. Phone Encounter #  
 6/13/2017  8:00 AM Isaura Hager, [facility] [phone] [id number] Follow-up Instructions Return in about 1 month (around 7/13/2017) for hypertension. Upcoming Health Maintenance Date Due Pneumococcal 65+ Low/Medium Risk (2 of 2 - PPSV23) 12/28/2017* INFLUENZA AGE 9 TO ADULT 8/1/2017 LIPID PANEL Q1 8/31/2017 HEMOGLOBIN A1C Q6M 10/20/2017 MICROALBUMIN Q1 12/6/2017 FOOT EXAM Q1 4/20/2018 EYE EXAM RETINAL OR DILATED Q1 5/18/2018 MEDICARE YEARLY EXAM 6/3/2018 GLAUCOMA SCREENING Q2Y 5/18/2019 COLONOSCOPY 7/8/2021 DTaP/Tdap/Td series (2 - Td) 6/2/2027 *Topic was postponed. The date shown is not the original due date. Allergies as of 6/13/2017  Review Complete On: 6/13/2017 By: Isaura Hager MD  
  
 Severity Noted Reaction Type Reactions Iodinated Contrast Media - Oral And Iv Dye  06/07/2017    Other (comments) PT HAS RENAL DISEASE AND IS UNABLE TO TOLERATE Lisinopril  11/20/2014    Other (comments) PT IS CURRENTLY TAKING THIS MEDICATION. IS NOT ALLERGIC Tramadol  06/07/2017   System[facility] Other (comments) BRADYCARDIA Current Immunizations  Reviewed on 11/15/2013 Name Date Influenza High Dose Vaccine PF 10/1/2015 Influenza Vaccine Split 11/1/2012 Pneumococcal Vaccine (Unspecified Type) 11/11/2005 Not reviewed this visit You Were Diagnosed With   
  
 Codes Comments Essential hypertension    -  Primary ICD-10-CM: I10 
ICD-9-CM: 401.9 Vitals BP Pulse Temp Resp Height(growth percentile) Weight(growth percentile) 172/71 (BP 1 Location: Left arm, BP Patient Position: Sitting) 76 98.5 °F (36.9 °C) (Oral) 16 5' 7\" (1.702 m) 138 lb (62.6 kg) SpO2 BMI Smoking Status 100% 21.61 kg/m2 Former Smoker Vitals History BMI and BSA Data Body Mass Index Body Surface Area 21.61 kg/m 2 1.72 m 2 Preferred Pharmacy Pharmacy Name Phone [address] [facility], [facility] [phone] Your Updated Medication List  
  
   
This list is accurate as of: 6/13/17  8:27 AM.  Always use your most recent med list. amLODIPine 10 mg tablet Commonly known as:  NORVASC  
take 1 tablet by mouth once daily  
  
 aspirin delayed-release 81 mg tablet Take 1 Tab by mouth daily. atorvastatin 80 mg tablet Commonly known as:  LIPITOR  
TAKE 1 TABLET BY MOUTH EVERY NIGHT AT BEDTIME  
  
 calcitRIOL 0.25 mcg capsule Commonly known as:  ROCALTROL TK 1 C PO D  
  
 CALCIUM 600 WITH VITAMIN D3 600 mg(1,500mg) -400 unit Cap Generic drug:  Calcium-Cholecalciferol (D3) Take 1 Cap by mouth daily. ferrous sulfate 325 mg (65 mg iron) tablet TK 1 T PO QOD  
  
 folic acid 1 mg tablet Commonly known as:  FOLVITE  
take 1 tablet by mouth once daily  
  
 furosemide 40 mg tablet Commonly known as:  LASIX 1qd hydrALAZINE 10 mg tablet Commonly known as:  APRESOLINE Take 1 Tab by mouth three (3) times daily. Indications: hypertension Insulin Lisp & Lisp Prot (Hum) 100 unit/mL (75-25) Inpn Commonly known as:  HumaLOG Mix 75-25 KwikPen 10 units am and 8 units pm  
  
 isosorbide mononitrate ER 30 mg tablet Commonly known as:  IMDUR  
1qd  
  
 lisinopril 20 mg tablet Commonly known as:  Alessia Feil Take 2 Tabs by mouth daily. Rose Pen Needle 32 gauge x 5/32\" Ndle Generic drug:  Insulin Needles (Disposable) USE TWICE DAILY AS DIRECTED  
  
 omeprazole 20 mg capsule Commonly known as:  PRILOSEC Take 1 Cap by mouth daily. pentoxifylline  mg CR tablet Commonly known as:  TRENTAL TAKE 1 TABLET BY MOUTH THREE TIMES DAILY Prescriptions Sent to Pharmacy  Refills  
 hydrALAZINE (APRESOLINE) 10 mg tablet 5  
 Sig: Take 1 Tab by mouth three (3) times daily. Indications: hypertension Class: Normal  
 Pharmacy: [facility] [address] [facility] [address]  #: [phone] Route: Oral  
  
Follow-up Instructions Return in about 1 month (around 7/13/2017) for hypertension. Patient Instructions Please stop taking clonidine and start taking hydralazine 10 mg three times daily. Please let me know if any side effects occur. Hydralazine (By mouth) Hydralazine Hydrochloride (ssx-DVHI-k-zeen cyrus-droe-KLOR-clem) Treats high blood pressure. Brand Name(s):  
There may be other brand names for this medicine. When This Medicine Should Not Be Used: This medicine is not right for everyone. Do not use it if you had an allergic reaction to hydralazine, or if you have coronary artery disease or rheumatic heart disease. How to Use This Medicine:  
Tablet · Take your medicine as directed. Your dose may need to be changed several times to find what works best for you. · Missed dose: Take a dose as soon as you remember. If it is almost time for your next dose, wait until then and take a regular dose. Do not take extra medicine to make up for a missed dose. · Store the medicine in a closed container at room temperature, away from heat, moisture, and direct light. Drugs and Foods to Avoid: Ask your doctor or pharmacist before using any other medicine, including over-the-counter medicines, vitamins, and herbal products. · Some foods and medicines can affect how hydralazine works. Tell your doctor if you are using diazoxide or an MAO inhibitor. Warnings While Using This Medicine: · Tell your doctor if you are pregnant or breastfeeding, or if you have kidney disease, heart or blood vessel disease, heart rhythm problems, lupus, or if you had a heart attack or stroke. · This medicine may cause the following problems: 
¨ Lupus-like syndrome ¨ Changes in heart rhythm ¨ Nerve problems · This medicine may lower your blood pressure too much and cause you to feel dizzy. Do not drive or do anything else that could be dangerous until you know how this medicine affects you. · Your doctor will do lab tests at regular visits to check on the effects of this medicine. Keep all appointments. · Keep all medicine out of the reach of children. Never share your medicine with anyone. Possible Side Effects While Using This Medicine:  
Call your doctor right away if you notice any of these side effects: · Allergic reaction: Itching or hives, swelling in your face or hands, swelling or tingling in your mouth or throat, chest tightness, trouble breathing · Change in how much or how often you urinate · Chest pain that may spread to your arms, jaw, back, or neck, trouble breathing, unusual sweating, faintness · Fast, pounding, or uneven heartbeat · Fever, chills, cough, sore throat, and body aches · Lightheadedness, dizziness, or fainting · Numbness, tingling, or burning pain in your hands, arms, legs, or feet · Unusual bleeding, bruising, or weakness If you notice these less serious side effects, talk with your doctor: · Diarrhea, nausea, vomiting, loss of appetite · Headache · Stuffy nose or watery eyes If you notice other side effects that you think are caused by this medicine, tell your doctor. Call your doctor for medical advice about side effects. You may report side effects to FDA at 5-028-FDA-7609 © 2017 [address] Information is for End User's use only and may not be sold, redistributed or otherwise used for commercial purposes. The above information is an  only. It is not intended as medical advice for individual conditions or treatments. Talk to your doctor, nurse or pharmacist before following any medical regimen to see if it is safe and effective for you. Introducing [facility]! [facility] introduces [employer] patient portal. Now you can access parts of your medical record, email your doctor's office, and request medication refills online. 1. In your internet browser, go to [email or web address] 2. Click on the First Time User? Click Here link in the Sign In box. You will see the New Member Sign Up page. 3. Enter your [employer] Access Code exactly as it appears below. You will not need to use this code after youve completed the sign-up process. If you do not sign up before the expiration date, you must request a new code. · [employer] Access Code: [email or web address] Expires: 7/19/2017  8:26 AM 
 
4. Enter the last four digits of your Social Security Number (xxxx) and Date of Birth (mm/dd/yyyy) as indicated and click Submit. You will be taken to the next sign-up page. 5. Create a [employer] ID. This will be your [employer] login ID and cannot be changed, so think of one that is secure and easy to remember. 6. Create a [employer] password. You can change your password at any time. 7. Enter your Password Reset Question and Answer. This can be used at a later time if you forget your password. 8. Enter your e-mail address. You will receive e-mail notification when new information is available in [facility] 9. Click Sign Up. You can now view and download portions of your medical record. 10. Click the Download Summary menu link to download a portable copy of your medical information. If you have questions, please visit the Frequently Asked Questions section of the [employer] website. Remember, [employer] is NOT to be used for urgent needs. For medical emergencies, dial 911. Now available from your iPhone and Android! Please provide this summary of care documentation to your next provider. Your primary care clinician is listed as [address] [place] [address] Ave. If you have any questions after today's visit, please call [phone].

## 2017-06-13 NOTE — PROGRESS NOTES
Pt presents today to discuss Clonidine dose       Pt preferred language for health care discussion is english. Is someone accompanying this pt?no    Is the patient using any DME equipment during OV? no    Depression Screening completed. yes    Health Maintenance reviewed and discussed per provider. Yes    Advance Directive:  1. Do you have an advance directive in place? Patient Reply: Yes      Coordination of Care:  1. Have you been to the ER, urgent care clinic since your last visit? Hospitalized since your last visit? No    2. Have you seen or consulted any other health care providers outside of the [address] [place] [address] since your last visit? Include any pap smears or colon screening.  No

## 2017-06-13 NOTE — PROGRESS NOTES
HISTORY OF PRESENT ILLNESS  Tadeo Bruce is a 65 y.o. male. HPI Comments: 64 yo male here for f/u of HTN. We had adjusted medication at last visit to reduce clonidine due to bradycardia, low BP. Instead of taking 0.1mg TID, he had been taking 0.3mg daily. Yesterday he noted he did not feel well. HR checked at home was in 30s with low BP. Held all of his medications and felt better, though BP is now elevated. Review of Systems   Constitutional: Negative for chills and fever. HENT: Negative for congestion. Eyes: Negative for blurred vision and pain. Respiratory: Negative for cough and shortness of breath. Cardiovascular: Negative for chest pain, palpitations and leg swelling. Gastrointestinal: Negative for heartburn, nausea and vomiting. Musculoskeletal: Negative for joint pain and myalgias. Neurological: Positive for dizziness (when BP low). Negative for tingling and headaches. Psychiatric/Behavioral: Negative for depression. The patient is not nervous/anxious.       Past Medical History:   Diagnosis Date    [facility] [address]     Anemia in chronic kidney disease     Arthritis     ASCVD (arteriosclerotic cardiovascular disease)     Benign hypertensive kidney disease     Chronic kidney disease     STAGE 3    CKD (chronic kidney disease) stage 2, GFR 60-89 ml/min 4/18/2013    CKD (chronic kidney disease), stage III     Diabetes mellitus with chronic kidney disease ([facility] [address]     Dialysis patient ([facility] [address] 09/2014    NOT CURRENTLY ON DIALYSIS    Dyslipidemia     GERD (gastroesophageal reflux disease)     Glaucoma     Heart attack ([address] 08/2014    Hypertension     Left renal artery stenosis (HCC)     NSTEMI (non-ST elevated myocardial infarction) ([facility] [address]     PAD (peripheral artery disease) (HCC)     PAF (paroxysmal atrial fibrillation) ([facility] [address] 9/2014    Pulmonary asbestosis ([facility] [address]     Renal mass, right     Stroke [facility])     TIA     Current Outpatient Prescriptions on File Prior to Visit   Medication Sig Dispense Refill    lisinopril (PRINIVIL, ZESTRIL) 20 mg tablet Take 2 Tabs by mouth daily. 30 Tab 2    Calcium-Cholecalciferol, D3, (CALCIUM 600 WITH VITAMIN D3) 600 mg(1,500mg) -400 unit cap Take 1 Cap by mouth daily.  OCTAVIO PEN NEEDLE 32 gauge x 5/32\" ndle USE TWICE DAILY AS DIRECTED 200 Pen Needle 3    isosorbide mononitrate ER (IMDUR) 30 mg tablet 1qd 60 Tab 5    ferrous sulfate 325 mg (65 mg iron) tablet TK 1 T PO QOD  8    calcitRIOL (ROCALTROL) 0.25 mcg capsule TK 1 C PO D  9    atorvastatin (LIPITOR) 80 mg tablet TAKE 1 TABLET BY MOUTH EVERY NIGHT AT BEDTIME 30 Tab 11    folic acid (FOLVITE) 1 mg tablet take 1 tablet by mouth once daily 30 Tab 11    pentoxifylline CR (TRENTAL) 400 mg CR tablet TAKE 1 TABLET BY MOUTH THREE TIMES DAILY 90 Tab 11    amLODIPine (NORVASC) 10 mg tablet take 1 tablet by mouth once daily 60 Tab 5    omeprazole (PRILOSEC) 20 mg capsule Take 1 Cap by mouth daily. 60 Cap 5    aspirin delayed-release 81 mg tablet Take 1 Tab by mouth daily. 60 Tab 5    Insulin Lisp & Lisp Prot, Hum, (HUMALOG MIX 75-25 KWIKPEN) 100 unit/mL (75-25) inpn 10 units am and 8 units pm 3 Pen 3    furosemide (LASIX) 40 mg tablet 1qd 60 Tab 5     No current facility-administered medications on file prior to visit. Social History   Substance Use Topics    Smoking status: Former Smoker     Packs/day: 1.00     Years: 20.00     Types: Cigarettes     Quit date: 6/7/1974    Smokeless tobacco: Never Used    Alcohol use No     Physical Exam   Constitutional: He appears well-developed and well-nourished. No distress. /71 (BP 1 Location: Left arm, BP Patient Position: Sitting)  Pulse 76  Temp 98.5 °F (36.9 °C) (Oral)   Resp 16  Ht 5' 7\" (1.702 m)  Wt 138 lb (62.6 kg)  SpO2 100%  BMI 21.61 kg/m2     Eyes: EOM are normal. Right eye exhibits no discharge. Left eye exhibits no discharge. No scleral icterus.    Cardiovascular: Normal rate, regular rhythm and normal heart sounds. Exam reveals no gallop and no friction rub. No murmur heard. Pulmonary/Chest: Effort normal and breath sounds normal. No respiratory distress. He has no wheezes. He has no rales. Musculoskeletal: He exhibits no edema or tenderness. Neurological: He is alert. He exhibits normal muscle tone. Skin: Skin is warm and dry. Psychiatric: He has a normal mood and affect. Lab Results   Component Value Date/Time    Sodium 138 06/07/2017 01:06 PM    Potassium 4.6 06/07/2017 01:06 PM    Chloride 101 06/07/2017 01:06 PM    CO2 29 06/07/2017 01:06 PM    Glucose 188 06/07/2017 01:06 PM    BUN 72 06/07/2017 01:06 PM    Creatinine 4.3 06/07/2017 01:06 PM    BUN/Creatinine ratio 18 03/04/2015 02:14 PM    GFR est AA 17.0 06/07/2017 01:06 PM    GFR est non-AA 14 06/07/2017 01:06 PM    Calcium 9.7 06/07/2017 01:06 PM    Bilirubin, total 0.6 08/21/2014 02:41 PM    AST (SGOT) 29 08/21/2014 02:41 PM    Alk. phosphatase 85 08/21/2014 02:41 PM    Protein, total 6.5 08/21/2014 02:41 PM    Albumin 3.0 04/25/2017 11:35 AM    A-G Ratio 1.6 08/21/2014 02:41 PM    ALT (SGPT) 36 08/21/2014 02:41 PM     ASSESSMENT and PLAN    ICD-10-CM ICD-9-CM    1. Essential hypertension I10 401.9    Will stop clonidine given recurring bradycardia and try adding hydralazine 10mg TID.

## 2017-06-20 ENCOUNTER — PATIENT OUTREACH (OUTPATIENT)
Dept: INTERNAL MEDICINE CLINIC | Age: 77
End: 2017-06-20

## 2017-06-20 NOTE — PROGRESS NOTES
[facility] Transitions of care Episode of [facility] 5/9/17 - 5/11/17.

## 2017-07-06 RX ORDER — LISINOPRIL 20 MG/1
TABLET ORAL
Qty: 60 TAB | Refills: 0 | Status: SHIPPED | OUTPATIENT
Start: 2017-07-06 | End: 2017-08-04 | Stop reason: SDUPTHER

## 2017-07-21 NOTE — PROGRESS NOTES
Transitional Care Nurse Navigator Note:  Hospital Follow Up for Admission from [facility] 6/16/17    Per EMR due to:   Right inguinal repair      NN outgoing call to patient. Pt follow up appts reviewed. He will follow up as directed. Pt doesn't have any concerns at this time.

## 2017-08-04 RX ORDER — LISINOPRIL 20 MG/1
TABLET ORAL
Qty: 60 TAB | Refills: 0 | Status: SHIPPED | OUTPATIENT
Start: 2017-08-04 | End: 2017-08-31 | Stop reason: SDUPTHER

## 2017-08-04 NOTE — TELEPHONE ENCOUNTER
Requested Prescriptions     Pending Prescriptions Disp Refills    lisinopril (PRINIVIL, ZESTRIL) 20 mg tablet 60 Tab 0

## 2017-10-13 RX ORDER — FUROSEMIDE 40 MG/1
TABLET ORAL
Qty: 60 TAB | Refills: 0 | Status: SHIPPED | OUTPATIENT
Start: 2017-10-13 | End: 2017-12-18 | Stop reason: SDUPTHER

## 2017-10-20 RX ORDER — AMLODIPINE BESYLATE 10 MG/1
TABLET ORAL
Qty: 60 TAB | Refills: 0 | Status: SHIPPED | OUTPATIENT
Start: 2017-10-20 | End: 2017-11-21 | Stop reason: SDUPTHER

## 2017-10-20 RX ORDER — ASPIRIN 81 MG/1
TABLET ORAL
Qty: 60 TAB | Refills: 0 | Status: SHIPPED | OUTPATIENT
Start: 2017-10-20 | End: 2017-12-18 | Stop reason: SDUPTHER

## 2017-10-20 RX ORDER — OMEPRAZOLE 20 MG/1
CAPSULE, DELAYED RELEASE ORAL
Qty: 60 CAP | Refills: 0 | Status: SHIPPED | OUTPATIENT
Start: 2017-10-20 | End: 2017-11-21 | Stop reason: SDUPTHER

## 2017-11-03 ENCOUNTER — OFFICE VISIT (OUTPATIENT)
Dept: INTERNAL MEDICINE CLINIC | Age: 77
End: 2017-11-03

## 2017-11-03 VITALS
HEART RATE: 67 BPM | SYSTOLIC BLOOD PRESSURE: 202 MMHG | TEMPERATURE: 97 F | BODY MASS INDEX: 21.97 KG/M2 | WEIGHT: 140 LBS | DIASTOLIC BLOOD PRESSURE: 70 MMHG | RESPIRATION RATE: 14 BRPM | HEIGHT: 67 IN | OXYGEN SATURATION: 100 %

## 2017-11-03 DIAGNOSIS — E11.21 DIABETIC NEPHROPATHY ASSOCIATED WITH TYPE 2 DIABETES MELLITUS (HCC): ICD-10-CM

## 2017-11-03 DIAGNOSIS — Z23 ENCOUNTER FOR IMMUNIZATION: ICD-10-CM

## 2017-11-03 DIAGNOSIS — I10 ESSENTIAL HYPERTENSION: Primary | ICD-10-CM

## 2017-11-03 RX ORDER — HYDRALAZINE HYDROCHLORIDE 25 MG/1
25 TABLET, FILM COATED ORAL 3 TIMES DAILY
Qty: 270 TAB | Refills: 3 | Status: SHIPPED | OUTPATIENT
Start: 2017-11-03 | End: 2018-01-01 | Stop reason: SDUPTHER

## 2017-11-03 NOTE — PROGRESS NOTES
Immunization/s administered 11/3/2017 by Ирина Rolon LPN with guardian's consent. Patient tolerated procedure well. No reactions noted.

## 2017-11-03 NOTE — PROGRESS NOTES
Chief Complaint   Patient presents with    Hypertension    Cholesterol Problem    Diabetes       Pt preferred language for health care discussion is english. Is someone accompanying this pt? no    Is the patient using any DME equipment during OV? no    Depression Screening:  PHQ over the last two weeks 6/13/2017 6/2/2017 5/16/2017 5/2/2017 12/8/2016 8/31/2016 6/14/2016   Little interest or pleasure in doing things Not at all Not at all Nearly every day Not at all Not at all Not at all Not at all   Feeling down, depressed or hopeless Not at all Not at all Several days Not at all Not at all Not at all Not at all   Total Score PHQ 2 0 0 4 0 0 0 0       Learning Assessment:  Learning Assessment 6/8/2015 11/5/2013   PRIMARY LEARNER Patient Patient   HIGHEST LEVEL OF EDUCATION - PRIMARY LEARNER  GRADUATED HIGH SCHOOL OR GED GRADUATED HIGH SCHOOL OR GED   PRIMARY [address]    NEED - No   LEARNER PREFERENCE PRIMARY DEMONSTRATION VIDEOS   LEARNING SPECIAL TOPICS - no   ANSWERED BY patient patient   RELATIONSHIP SELF SELF       Abuse Screening:  Abuse Screening Questionnaire 6/2/2017 5/11/2016 6/8/2015 3/4/2015   Do you ever feel afraid of your partner? N N N N   Are you in a relationship with someone who physically or mentally threatens you? N N N N   Is it safe for you to go home? Carol Warren       Fall Risk  Fall Risk Assessment, last 12 mths 6/13/2017 6/2/2017 5/2/2017 12/8/2016 8/31/2016 6/14/2016 5/11/2016   Able to walk? Yes Yes Yes Yes Yes Yes Yes   Fall in past 12 months? No No No No No No No         Health Maintenance reviewed and discussed per provider. Yes    Pt is due for lipid, influenza. Please order/place referral if appropriate. Pt currently taking Antiplatelet therapy? Aspirin    Advance Directive:  1. Do you have an advance directive in place? Patient Reply: yes    2. If not, would you like material regarding how to put one in place?  Patient Reply: asked if he could bring it in or we could give him our papers (declined our papers at this time)    Coordination of Care:  1. Have you been to the ER, urgent care clinic since your last visit? Hospitalized since your last visit? Yes, urgent care swollen gland    2. Have you seen or consulted any other health care providers outside of the [address] [place] [address] since your last visit? Include any pap smears or colon screening. no    Please see Red banners under Allergies, Med rec, Immunizations to remove outside inquires. All correct information has been verified with patient and added to chart.      Patient didn't eat but has a piece of candy in his mouth

## 2017-11-03 NOTE — PATIENT INSTRUCTIONS
Hydralazine (By mouth)   Hydralazine Hydrochloride (obf-FTOP-z-priscillaen cyrus-droe-KLOR-clem)  Treats high blood pressure. Brand Name(s):   There may be other brand names for this medicine. When This Medicine Should Not Be Used: This medicine is not right for everyone. Do not use it if you had an allergic reaction to hydralazine, or if you have coronary artery disease or rheumatic heart disease. How to Use This Medicine:   Tablet  · Take your medicine as directed. Your dose may need to be changed several times to find what works best for you. · Missed dose: Take a dose as soon as you remember. If it is almost time for your next dose, wait until then and take a regular dose. Do not take extra medicine to make up for a missed dose. · Store the medicine in a closed container at room temperature, away from heat, moisture, and direct light. Drugs and Foods to Avoid:   Ask your doctor or pharmacist before using any other medicine, including over-the-counter medicines, vitamins, and herbal products. · Some foods and medicines can affect how hydralazine works. Tell your doctor if you are using diazoxide or an MAO inhibitor. Warnings While Using This Medicine:   · Tell your doctor if you are pregnant or breastfeeding, or if you have kidney disease, heart or blood vessel disease, heart rhythm problems, lupus, or if you had a heart attack or stroke. · This medicine may cause the following problems:  ¨ Lupus-like syndrome  ¨ Changes in heart rhythm  ¨ Nerve problems  · This medicine may lower your blood pressure too much and cause you to feel dizzy. Do not drive or do anything else that could be dangerous until you know how this medicine affects you. · Your doctor will do lab tests at regular visits to check on the effects of this medicine. Keep all appointments. · Keep all medicine out of the reach of children. Never share your medicine with anyone.   Possible Side Effects While Using This Medicine:   Call your doctor right away if you notice any of these side effects:  · Allergic reaction: Itching or hives, swelling in your face or hands, swelling or tingling in your mouth or throat, chest tightness, trouble breathing  · Change in how much or how often you urinate  · Chest pain that may spread to your arms, jaw, back, or neck, trouble breathing, unusual sweating, faintness  · Fast, pounding, or uneven heartbeat  · Fever, chills, cough, sore throat, and body aches  · Lightheadedness, dizziness, or fainting  · Numbness, tingling, or burning pain in your hands, arms, legs, or feet  · Unusual bleeding, bruising, or weakness  If you notice these less serious side effects, talk with your doctor:   · Diarrhea, nausea, vomiting, loss of appetite  · Headache  · Stuffy nose or watery eyes  If you notice other side effects that you think are caused by this medicine, tell your doctor. Call your doctor for medical advice about side effects. You may report side effects to FDA at 2-514-FDA-8365  © 2017 [employer] Information is for End User's use only and may not be sold, redistributed or otherwise used for commercial purposes. The above information is an  only. It is not intended as medical advice for individual conditions or treatments. Talk to your doctor, nurse or pharmacist before following any medical regimen to see if it is safe and effective for you.

## 2017-11-03 NOTE — ACP (ADVANCE CARE PLANNING)
Advance Directive:  1. Do you have an advance directive in place? Patient Reply: yes    2. If not, would you like material regarding how to put one in place?  Patient Reply: asked if he could bring it in or we could give him our papers (declined our papers at this time)

## 2017-11-03 NOTE — PROGRESS NOTES
HISTORY OF PRESENT ILLNESS  Ramez Bui is a 65 y.o. male. HPI Comments: 64 yo male here for f/u of HTN. BP has been elevated. No CP, SOB. Did not take morning medication today. DM : last A1c stable. Glc doing well on home monitoring. Review of Systems   Constitutional: Negative for chills, fever and weight loss. HENT: Negative for congestion and ear pain. Eyes: Negative for blurred vision and pain. Respiratory: Negative for cough and shortness of breath. Cardiovascular: Negative for chest pain, palpitations and leg swelling. Gastrointestinal: Negative for nausea and vomiting. Genitourinary: Negative for frequency and urgency. Musculoskeletal: Negative for joint pain and myalgias. Skin: Negative for itching and rash. Neurological: Negative for dizziness, tingling and headaches. Psychiatric/Behavioral: Negative for depression. The patient is not nervous/anxious.       Past Medical History:   Diagnosis Date    [facility]) [facility] [address]     Anemia in chronic kidney disease     Arthritis     ASCVD (arteriosclerotic cardiovascular disease)     Benign hypertensive kidney disease     Chronic kidney disease     STAGE 3    CKD (chronic kidney disease) stage 2, GFR 60-89 ml/min 4/18/2013    CKD (chronic kidney disease), stage III     Diabetes mellitus with chronic kidney disease ([facility] [address]     Dialysis patient ([facility] [address] 09/2014    NOT CURRENTLY ON DIALYSIS    Dyslipidemia     GERD (gastroesophageal reflux disease)     Glaucoma     Heart attack 08/2014    Hypertension     Left renal artery stenosis (HCC)     NSTEMI (non-ST elevated myocardial infarction) ([address]     PAD (peripheral artery disease) (HCC)     PAF (paroxysmal atrial fibrillation) ([facility] [address] 9/2014    Pulmonary asbestosis ([facility] [address]     Renal mass, right     Stroke [facility])     TIA     Current Outpatient Prescriptions on File Prior to Visit   Medication Sig Dispense Refill    aspirin delayed-release 81 mg tablet TAKE 1 TABLET BY MOUTH EVERY DAY 60 Tab 0    omeprazole (PRILOSEC) 20 mg capsule TAKE 1 CAPSULE BY MOUTH EVERY DAY 60 Cap 0    amLODIPine (NORVASC) 10 mg tablet TAKE 1 TABLET BY MOUTH EVERY DAY 60 Tab 0    furosemide (LASIX) 40 mg tablet TAKE 1 TABLET BY MOUTH EVERY DAY 60 Tab 0    lisinopril (PRINIVIL, ZESTRIL) 20 mg tablet TAKE 2 TABLETS BY MOUTH EVERY DAY 60 Tab 5    hydrALAZINE (APRESOLINE) 10 mg tablet Take 1 Tab by mouth three (3) times daily. Indications: hypertension 90 Tab 5    Calcium-Cholecalciferol, D3, (CALCIUM 600 WITH VITAMIN D3) 600 mg(1,500mg) -400 unit cap Take 1 Cap by mouth daily.  OCTAVIO PEN NEEDLE 32 gauge x 5/32\" ndle USE TWICE DAILY AS DIRECTED 200 Pen Needle 3    isosorbide mononitrate ER (IMDUR) 30 mg tablet 1qd 60 Tab 5    ferrous sulfate 325 mg (65 mg iron) tablet TK 1 T PO QOD  8    calcitRIOL (ROCALTROL) 0.25 mcg capsule TK 1 C PO D  9    atorvastatin (LIPITOR) 80 mg tablet TAKE 1 TABLET BY MOUTH EVERY NIGHT AT BEDTIME 30 Tab 11    folic acid (FOLVITE) 1 mg tablet take 1 tablet by mouth once daily 30 Tab 11    pentoxifylline CR (TRENTAL) 400 mg CR tablet TAKE 1 TABLET BY MOUTH THREE TIMES DAILY 90 Tab 11    Insulin Lisp & Lisp Prot, Hum, (HUMALOG MIX 75-25 KWIKPEN) 100 unit/mL (75-25) inpn 10 units am and 8 units pm 3 Pen 3     No current facility-administered medications on file prior to visit. Social History   Substance Use Topics    Smoking status: Former Smoker     Packs/day: 1.00     Years: 20.00     Types: Cigarettes     Quit date: 6/7/1974    Smokeless tobacco: Never Used    Alcohol use No     Physical Exam   Constitutional: He appears well-developed and well-nourished. No distress.    BP (!) 202/70 (BP 1 Location: Right arm, BP Patient Position: Sitting) Comment: didn't take his medication this morning  Pulse 67  Temp 97 °F (36.1 °C) (Oral)   Resp 14  Ht 5' 7\" (1.702 m)  Wt 140 lb (63.5 kg)  SpO2 100%  BMI 21.93 kg/m2     Eyes: EOM are normal. Right eye exhibits no discharge. Left eye exhibits no discharge. No scleral icterus. Neck: Neck supple. Cardiovascular: Normal rate, regular rhythm and normal heart sounds. Exam reveals no gallop and no friction rub. No murmur heard. Pulmonary/Chest: Effort normal and breath sounds normal. No respiratory distress. He has no wheezes. He has no rales. Musculoskeletal: He exhibits no edema or tenderness. Lymphadenopathy:     He has no cervical adenopathy. Neurological: He is alert. He exhibits normal muscle tone. Skin: Skin is warm and dry. Psychiatric: He has a normal mood and affect. Lab Results   Component Value Date/Time    Sodium 135 10/06/2017 02:07 PM    Potassium 4.0 10/06/2017 02:07 PM    Chloride 101 10/06/2017 02:07 PM    CO2 23 10/06/2017 02:07 PM    Glucose 156 10/06/2017 02:07 PM    BUN 73 10/06/2017 02:07 PM    Creatinine 4.0 10/06/2017 02:07 PM    BUN/Creatinine ratio 18 03/04/2015 02:14 PM    GFR est AA 19.0 10/06/2017 02:07 PM    GFR est non-AA 16 10/06/2017 02:07 PM    Calcium 8.7 10/06/2017 02:07 PM    Bilirubin, total 0.6 08/21/2014 02:41 PM    AST (SGOT) 29 08/21/2014 02:41 PM    Alk. phosphatase 85 08/21/2014 02:41 PM    Protein, total 6.5 08/21/2014 02:41 PM    Albumin 3.5 10/06/2017 02:07 PM    A-G Ratio 1.6 08/21/2014 02:41 PM    ALT (SGPT) 36 08/21/2014 02:41 PM     Lab Results   Component Value Date/Time    WBC 4.1 10/06/2017 02:07 PM    HGB 8.7 10/06/2017 02:07 PM    HCT 26.4 10/06/2017 02:07 PM    PLATELET 357 92/83/2083 02:07 PM    MCV 84.1 10/06/2017 02:07 PM     Lab Results   Component Value Date/Time    Hemoglobin A1c 7.4 07/21/2017 02:13 PM    Hemoglobin A1c (POC) 7.5 04/20/2017 08:15 AM     ASSESSMENT and PLAN    ICD-10-CM ICD-9-CM    1. Essential hypertension I10 401.9    2. Diabetic nephropathy associated with type 2 diabetes mellitus (HCC) E11.21 250.40      583.81    3.  Encounter for immunization Z23 V03.89 INFLUENZA VIRUS VACCINE, HIGH DOSE SEASONAL, PRESERVATIVE FREE     Will increase hydralazine to 25mg tid and monitor  Repeat labs next visit.    Flu shot today

## 2017-11-03 NOTE — MR AVS SNAPSHOT
Visit Information Date & Time Provider Department Dept. Phone Encounter #  
 11/3/2017  7:00 AM [address] [phone] [id number] Follow-up Instructions Return in about 3 months (around 2/3/2018) for hypertension. Upcoming Health Maintenance Date Due INFLUENZA AGE 9 TO ADULT 8/1/2017 LIPID PANEL Q1 8/31/2017 Pneumococcal 65+ Low/Medium Risk (2 of 2 - PPSV23) 12/28/2017* MICROALBUMIN Q1 12/6/2017 HEMOGLOBIN A1C Q6M 1/21/2018 FOOT EXAM Q1 4/20/2018 EYE EXAM RETINAL OR DILATED Q1 5/18/2018 MEDICARE YEARLY EXAM 6/3/2018 GLAUCOMA SCREENING Q2Y 5/18/2019 COLONOSCOPY 7/8/2021 DTaP/Tdap/Td series (2 - Td) 6/2/2027 *Topic was postponed. The date shown is not the original due date. Allergies as of 11/3/2017  Review Complete On: 11/3/2017 By: Whitley Rod Severity Noted Reaction Type Reactions Iodinated Contrast- Oral And Iv Dye  06/07/2017    Other (comments) PT HAS RENAL DISEASE AND IS UNABLE TO TOLERATE Lisinopril  11/20/2014    Other (comments) PT IS CURRENTLY TAKING THIS MEDICATION. IS NOT ALLERGIC Tramadol  06/07/2017   Systemic Other (comments) BRADYCARDIA Current Immunizations  Reviewed on 11/15/2013 Name Date Influenza High Dose Vaccine PF  Incomplete, 10/1/2015 Influenza Vaccine Split 11/1/2012 Pneumococcal Vaccine (Unspecified Type) 11/11/2005 Not reviewed this visit You Were Diagnosed With   
  
 Codes Comments Essential hypertension    -  Primary ICD-10-CM: I10 
ICD-9-CM: 401.9 Diabetic nephropathy associated with type 2 diabetes mellitus ([facility] [address]     ICD-10-CM: E11.21 
ICD-9-CM: 250.40, 583.81 Encounter for immunization     ICD-10-CM: B80 ICD-9-CM: V03.89 Vitals BP Pulse Temp Resp Height(growth percentile) Weight(growth percentile)  (!) 202/70 (BP 1 Location: Right arm, BP Patient Position: Sitting) 67 97 °F (36.1 °C) (Oral) 14 5' 7\" (1.702 m) 140 lb (63.5 kg) SpO2 BMI Smoking Status 100% 21.93 kg/m2 Former Smoker Vitals History BMI and BSA Data Body Mass Index Body Surface Area  
 21.93 kg/m 2 1.73 m 2 Preferred Pharmacy Pharmacy Name Phone [address] [phone] [phone] Your Updated Medication List  
  
   
This list is accurate as of: 11/3/17  7:34 AM.  Always use your most recent med list. amLODIPine 10 mg tablet Commonly known as:  Lemoyne Citron TAKE 1 TABLET BY MOUTH EVERY DAY  
  
 aspirin delayed-release 81 mg tablet TAKE 1 TABLET BY MOUTH EVERY DAY  
  
 atorvastatin 80 mg tablet Commonly known as:  LIPITOR  
TAKE 1 TABLET BY MOUTH EVERY NIGHT AT BEDTIME  
  
 calcitRIOL 0.25 mcg capsule Commonly known as:  ROCALTROL TK 1 C PO D  
  
 CALCIUM 600 WITH VITAMIN D3 600 mg(1,500mg) -400 unit Cap Generic drug:  Calcium-Cholecalciferol (D3) Take 1 Cap by mouth daily. ferrous sulfate 325 mg (65 mg iron) tablet TK 1 T PO QOD  
  
 folic acid 1 mg tablet Commonly known as:  FOLVITE  
take 1 tablet by mouth once daily  
  
 furosemide 40 mg tablet Commonly known as:  LASIX TAKE 1 TABLET BY MOUTH EVERY DAY  
  
 hydrALAZINE 25 mg tablet Commonly known as:  APRESOLINE Take 1 Tab by mouth three (3) times daily. Insulin Lisp & Lisp Prot (Hum) 100 unit/mL (75-25) Inpn Commonly known as:  HumaLOG Mix 75-25 KwikPen 10 units am and 8 units pm  
  
 isosorbide mononitrate ER 30 mg tablet Commonly known as:  IMDUR  
1qd  
  
 lisinopril 20 mg tablet Commonly known as:  PRINIVIL, ZESTRIL  
TAKE 2 TABLETS BY MOUTH EVERY DAY Rose Pen Needle 32 gauge x 5/32\" Ndle Generic drug:  Insulin Needles (Disposable) USE TWICE DAILY AS DIRECTED  
  
 omeprazole 20 mg capsule Commonly known as:  PRILOSEC  
 TAKE 1 CAPSULE BY MOUTH EVERY DAY pentoxifylline  mg CR tablet Commonly known as:  TRENTAL TAKE 1 TABLET BY MOUTH THREE TIMES DAILY Prescriptions Sent to Pharmacy Refills  
 hydrALAZINE (APRESOLINE) 25 mg tablet 3 Sig: Take 1 Tab by mouth three (3) times daily. Class: Normal  
 Pharmacy: [facility] [phone]  #: [phone] Route: Oral  
  
We Performed the Following INFLUENZA VIRUS VACCINE, HIGH DOSE SEASONAL, PRESERVATIVE FREE [19668 CPT(R)] Follow-up Instructions Return in about 3 months (around 2/3/2018) for hypertension. Patient Instructions Hydralazine (By mouth) Hydralazine Hydrochloride (ogg-XDBM-t-zeen cyrus-droe-KLOR-clem) Treats high blood pressure. Brand Name(s):  
There may be other brand names for this medicine. When This Medicine Should Not Be Used: This medicine is not right for everyone. Do not use it if you had an allergic reaction to hydralazine, or if you have coronary artery disease or rheumatic heart disease. How to Use This Medicine:  
Tablet · Take your medicine as directed. Your dose may need to be changed several times to find what works best for you. · Missed dose: Take a dose as soon as you remember. If it is almost time for your next dose, wait until then and take a regular dose. Do not take extra medicine to make up for a missed dose. · Store the medicine in a closed container at room temperature, away from heat, moisture, and direct light. Drugs and Foods to Avoid: Ask your doctor or pharmacist before using any other medicine, including over-the-counter medicines, vitamins, and herbal products. · Some foods and medicines can affect how hydralazine works. Tell your doctor if you are using diazoxide or an MAO inhibitor. Warnings While Using This Medicine: · Tell your doctor if you are pregnant or breastfeeding, or if you have kidney disease, heart or blood vessel disease, heart rhythm problems, lupus, or if you had a heart attack or stroke. · This medicine may cause the following problems: 
¨ Lupus-like syndrome ¨ Changes in heart rhythm ¨ Nerve problems · This medicine may lower your blood pressure too much and cause you to feel dizzy. Do not drive or do anything else that could be dangerous until you know how this medicine affects you. · Your doctor will do lab tests at regular visits to check on the effects of this medicine. Keep all appointments. · Keep all medicine out of the reach of children. Never share your medicine with anyone. Possible Side Effects While Using This Medicine:  
Call your doctor right away if you notice any of these side effects: · Allergic reaction: Itching or hives, swelling in your face or hands, swelling or tingling in your mouth or throat, chest tightness, trouble breathing · Change in how much or how often you urinate · Chest pain that may spread to your arms, jaw, back, or neck, trouble breathing, unusual sweating, faintness · Fast, pounding, or uneven heartbeat · Fever, chills, cough, sore throat, and body aches · Lightheadedness, dizziness, or fainting · Numbness, tingling, or burning pain in your hands, arms, legs, or feet · Unusual bleeding, bruising, or weakness If you notice these less serious side effects, talk with your doctor: · Diarrhea, nausea, vomiting, loss of appetite · Headache · Stuffy nose or watery eyes If you notice other side effects that you think are caused by this medicine, tell your doctor. Call your doctor for medical advice about side effects. You may report side effects to FDA at 0-265-FDA-6210 © 2017 [address] Information is for End User's use only and may not be sold, redistributed or otherwise used for commercial purposes. The above information is an  only. It is not intended as medical advice for individual conditions or treatments. Talk to your doctor, nurse or pharmacist before following any medical regimen to see if it is safe and effective for you. Introducing [facility]! Ryan Tan introduces [employer] patient portal. Now you can access parts of your medical record, email your doctor's office, and request medication refills online. 1. In your internet browser, go to [email or web address] 2. Click on the First Time User? Click Here link in the Sign In box. You will see the New Member Sign Up page. 3. Enter your [employer] Access Code exactly as it appears below. You will not need to use this code after youve completed the sign-up process. If you do not sign up before the expiration date, you must request a new code. · [employer] Access Code: [email or web address] Expires: 2/1/2018  7:34 AM 
 
4. Enter the last four digits of your Social Security Number (xxxx) and Date of Birth (mm/dd/yyyy) as indicated and click Submit. You will be taken to the next sign-up page. 5. Create a [employer] ID. This will be your [employer] login ID and cannot be changed, so think of one that is secure and easy to remember. 6. Create a [employer] password. You can change your password at any time. 7. Enter your Password Reset Question and Answer. This can be used at a later time if you forget your password. 8. Enter your e-mail address. You will receive e-mail notification when new information is available in [address] 9. Click Sign Up. You can now view and download portions of your medical record. 10. Click the Download Summary menu link to download a portable copy of your medical information. If you have questions, please visit the Frequently Asked Questions section of the [employer] website. Remember, [employer] is NOT to be used for urgent needs. For medical emergencies, dial 911. Now available from your iPhone and Android! Please provide this summary of care documentation to your next provider. Your primary care clinician is listed as Tera Mensah. If you have any questions after today's visit, please call [phone].

## 2017-11-22 RX ORDER — AMLODIPINE BESYLATE 10 MG/1
TABLET ORAL
Qty: 60 TAB | Refills: 0 | Status: SHIPPED | OUTPATIENT
Start: 2017-11-22 | End: 2018-01-01 | Stop reason: SDUPTHER

## 2017-11-22 RX ORDER — OMEPRAZOLE 20 MG/1
CAPSULE, DELAYED RELEASE ORAL
Qty: 60 CAP | Refills: 0 | Status: SHIPPED | OUTPATIENT
Start: 2017-11-22 | End: 2018-01-01 | Stop reason: SDUPTHER

## 2017-12-19 RX ORDER — FUROSEMIDE 40 MG/1
TABLET ORAL
Qty: 60 TAB | Refills: 0 | Status: SHIPPED | OUTPATIENT
Start: 2017-12-19 | End: 2018-01-01 | Stop reason: SDUPTHER

## 2017-12-19 RX ORDER — ASPIRIN 81 MG/1
TABLET ORAL
Qty: 60 TAB | Refills: 0 | Status: SHIPPED | OUTPATIENT
Start: 2017-12-19 | End: 2018-01-01 | Stop reason: SDUPTHER

## 2017-12-19 RX ORDER — CALCIUM CARBONATE/VITAMIN D3 600 MG-10
TABLET ORAL
Qty: 30 TAB | Refills: 0 | Status: SHIPPED | OUTPATIENT
Start: 2017-12-19 | End: 2018-01-01 | Stop reason: SDUPTHER

## 2018-01-01 ENCOUNTER — OFFICE VISIT (OUTPATIENT)
Dept: INTERNAL MEDICINE CLINIC | Age: 78
End: 2018-01-01

## 2018-01-01 ENCOUNTER — TELEPHONE (OUTPATIENT)
Dept: INTERNAL MEDICINE CLINIC | Age: 78
End: 2018-01-01

## 2018-01-01 ENCOUNTER — PATIENT OUTREACH (OUTPATIENT)
Dept: INTERNAL MEDICINE CLINIC | Age: 78
End: 2018-01-01

## 2018-01-01 ENCOUNTER — DOCUMENTATION ONLY (OUTPATIENT)
Dept: INTERNAL MEDICINE CLINIC | Age: 78
End: 2018-01-01

## 2018-01-01 VITALS
DIASTOLIC BLOOD PRESSURE: 59 MMHG | BODY MASS INDEX: 21.03 KG/M2 | OXYGEN SATURATION: 100 % | HEIGHT: 67 IN | WEIGHT: 134 LBS | TEMPERATURE: 97 F | SYSTOLIC BLOOD PRESSURE: 155 MMHG | HEART RATE: 59 BPM | RESPIRATION RATE: 16 BRPM

## 2018-01-01 VITALS
HEART RATE: 71 BPM | WEIGHT: 140 LBS | DIASTOLIC BLOOD PRESSURE: 56 MMHG | BODY MASS INDEX: 21.97 KG/M2 | OXYGEN SATURATION: 99 % | SYSTOLIC BLOOD PRESSURE: 175 MMHG | RESPIRATION RATE: 14 BRPM | TEMPERATURE: 97 F | HEIGHT: 67 IN

## 2018-01-01 VITALS
HEIGHT: 67 IN | TEMPERATURE: 98.5 F | HEART RATE: 65 BPM | DIASTOLIC BLOOD PRESSURE: 68 MMHG | OXYGEN SATURATION: 99 % | WEIGHT: 142.6 LBS | BODY MASS INDEX: 22.38 KG/M2 | RESPIRATION RATE: 18 BRPM | SYSTOLIC BLOOD PRESSURE: 198 MMHG

## 2018-01-01 VITALS
SYSTOLIC BLOOD PRESSURE: 128 MMHG | RESPIRATION RATE: 18 BRPM | DIASTOLIC BLOOD PRESSURE: 42 MMHG | TEMPERATURE: 98.4 F | HEIGHT: 67 IN | HEART RATE: 60 BPM | OXYGEN SATURATION: 98 % | WEIGHT: 134 LBS | BODY MASS INDEX: 21.03 KG/M2

## 2018-01-01 VITALS
WEIGHT: 143.8 LBS | OXYGEN SATURATION: 100 % | RESPIRATION RATE: 18 BRPM | DIASTOLIC BLOOD PRESSURE: 70 MMHG | HEIGHT: 67 IN | BODY MASS INDEX: 22.57 KG/M2 | SYSTOLIC BLOOD PRESSURE: 204 MMHG | HEART RATE: 40 BPM | TEMPERATURE: 96.4 F

## 2018-01-01 VITALS
HEART RATE: 69 BPM | BODY MASS INDEX: 20.78 KG/M2 | DIASTOLIC BLOOD PRESSURE: 64 MMHG | RESPIRATION RATE: 18 BRPM | HEIGHT: 67 IN | OXYGEN SATURATION: 98 % | WEIGHT: 132.4 LBS | SYSTOLIC BLOOD PRESSURE: 181 MMHG | TEMPERATURE: 97.5 F

## 2018-01-01 VITALS
WEIGHT: 143.4 LBS | HEIGHT: 67 IN | HEART RATE: 63 BPM | BODY MASS INDEX: 22.51 KG/M2 | OXYGEN SATURATION: 100 % | TEMPERATURE: 97.3 F | DIASTOLIC BLOOD PRESSURE: 68 MMHG | SYSTOLIC BLOOD PRESSURE: 197 MMHG | RESPIRATION RATE: 18 BRPM

## 2018-01-01 DIAGNOSIS — E11.9 TYPE 2 DIABETES MELLITUS WITHOUT COMPLICATION, WITH LONG-TERM CURRENT USE OF INSULIN (HCC): ICD-10-CM

## 2018-01-01 DIAGNOSIS — I73.9 PVD (PERIPHERAL VASCULAR DISEASE) (HCC): ICD-10-CM

## 2018-01-01 DIAGNOSIS — N18.4 CKD (CHRONIC KIDNEY DISEASE) STAGE 4, GFR 15-29 ML/MIN (HCC): ICD-10-CM

## 2018-01-01 DIAGNOSIS — Z71.89 ENCOUNTER FOR MEDICATION REVIEW AND COUNSELING: Primary | ICD-10-CM

## 2018-01-01 DIAGNOSIS — I45.10 RBBB: Primary | ICD-10-CM

## 2018-01-01 DIAGNOSIS — R07.9 CHEST PAIN, UNSPECIFIED TYPE: Primary | ICD-10-CM

## 2018-01-01 DIAGNOSIS — N18.6 CONTROLLED TYPE 2 DIABETES MELLITUS WITH CHRONIC KIDNEY DISEASE ON CHRONIC DIALYSIS, WITH LONG-TERM CURRENT USE OF INSULIN (HCC): ICD-10-CM

## 2018-01-01 DIAGNOSIS — R42 DIZZINESS: Primary | ICD-10-CM

## 2018-01-01 DIAGNOSIS — I10 UNCONTROLLED HYPERTENSION: ICD-10-CM

## 2018-01-01 DIAGNOSIS — I10 ESSENTIAL HYPERTENSION: ICD-10-CM

## 2018-01-01 DIAGNOSIS — E08.22 DIABETES MELLITUS DUE TO UNDERLYING CONDITION WITH STAGE 4 CHRONIC KIDNEY DISEASE, WITH LONG-TERM CURRENT USE OF INSULIN (HCC): ICD-10-CM

## 2018-01-01 DIAGNOSIS — Z23 ENCOUNTER FOR IMMUNIZATION: ICD-10-CM

## 2018-01-01 DIAGNOSIS — Z79.4 CONTROLLED TYPE 2 DIABETES MELLITUS WITH CHRONIC KIDNEY DISEASE ON CHRONIC DIALYSIS, WITH LONG-TERM CURRENT USE OF INSULIN (HCC): ICD-10-CM

## 2018-01-01 DIAGNOSIS — E11.22 CONTROLLED TYPE 2 DIABETES MELLITUS WITH CHRONIC KIDNEY DISEASE ON CHRONIC DIALYSIS, WITH LONG-TERM CURRENT USE OF INSULIN (HCC): ICD-10-CM

## 2018-01-01 DIAGNOSIS — Z79.4 TYPE 2 DIABETES MELLITUS WITHOUT COMPLICATION, WITH LONG-TERM CURRENT USE OF INSULIN (HCC): ICD-10-CM

## 2018-01-01 DIAGNOSIS — I10 ESSENTIAL HYPERTENSION: Primary | ICD-10-CM

## 2018-01-01 DIAGNOSIS — E11.21 DIABETIC NEPHROPATHY ASSOCIATED WITH TYPE 2 DIABETES MELLITUS (HCC): ICD-10-CM

## 2018-01-01 DIAGNOSIS — N18.6 ESRD (END STAGE RENAL DISEASE) (HCC): Primary | ICD-10-CM

## 2018-01-01 DIAGNOSIS — N18.5 CKD (CHRONIC KIDNEY DISEASE) STAGE 5, GFR LESS THAN 15 ML/MIN (HCC): ICD-10-CM

## 2018-01-01 DIAGNOSIS — K59.03 DRUG-INDUCED CONSTIPATION: ICD-10-CM

## 2018-01-01 DIAGNOSIS — N18.4 DIABETES MELLITUS DUE TO UNDERLYING CONDITION WITH STAGE 4 CHRONIC KIDNEY DISEASE, WITH LONG-TERM CURRENT USE OF INSULIN (HCC): ICD-10-CM

## 2018-01-01 DIAGNOSIS — Z99.2 CONTROLLED TYPE 2 DIABETES MELLITUS WITH CHRONIC KIDNEY DISEASE ON CHRONIC DIALYSIS, WITH LONG-TERM CURRENT USE OF INSULIN (HCC): ICD-10-CM

## 2018-01-01 DIAGNOSIS — Z79.4 DIABETES MELLITUS DUE TO UNDERLYING CONDITION WITH STAGE 4 CHRONIC KIDNEY DISEASE, WITH LONG-TERM CURRENT USE OF INSULIN (HCC): ICD-10-CM

## 2018-01-01 RX ORDER — PENTOXIFYLLINE 400 MG/1
TABLET, EXTENDED RELEASE ORAL
Qty: 90 TAB | Refills: 0 | Status: SHIPPED | OUTPATIENT
Start: 2018-01-01 | End: 2018-01-01

## 2018-01-01 RX ORDER — CARVEDILOL 25 MG/1
25 TABLET ORAL 2 TIMES DAILY WITH MEALS
Qty: 60 TAB | Refills: 5 | Status: SHIPPED | OUTPATIENT
Start: 2018-01-01 | End: 2018-01-01 | Stop reason: DRUGHIGH

## 2018-01-01 RX ORDER — CALCIUM CARBONATE/VITAMIN D3 600 MG-10
TABLET ORAL
Qty: 30 TAB | Refills: 0 | Status: SHIPPED | OUTPATIENT
Start: 2018-01-01 | End: 2018-01-01 | Stop reason: SDUPTHER

## 2018-01-01 RX ORDER — FUROSEMIDE 40 MG/1
TABLET ORAL
Qty: 60 TAB | Refills: 0 | Status: SHIPPED | OUTPATIENT
Start: 2018-01-01 | End: 2018-01-01 | Stop reason: SDUPTHER

## 2018-01-01 RX ORDER — INSULIN LISPRO 100 [IU]/ML
INJECTION, SUSPENSION SUBCUTANEOUS
Qty: 45 ML | Refills: 0 | Status: SHIPPED | OUTPATIENT
Start: 2018-01-01 | End: 2018-01-01 | Stop reason: SDUPTHER

## 2018-01-01 RX ORDER — HYDRALAZINE HYDROCHLORIDE 100 MG/1
100 TABLET, FILM COATED ORAL 3 TIMES DAILY
Qty: 270 TAB | Refills: 3 | Status: SHIPPED | OUTPATIENT
Start: 2018-01-01

## 2018-01-01 RX ORDER — OMEPRAZOLE 20 MG/1
CAPSULE, DELAYED RELEASE ORAL
Qty: 60 CAP | Refills: 0 | Status: SHIPPED | OUTPATIENT
Start: 2018-01-01 | End: 2018-01-01 | Stop reason: SDUPTHER

## 2018-01-01 RX ORDER — BLOOD SUGAR DIAGNOSTIC
STRIP MISCELLANEOUS
Qty: 100 STRIP | Refills: 0 | Status: SHIPPED | OUTPATIENT
Start: 2018-01-01 | End: 2018-01-01

## 2018-01-01 RX ORDER — ASPIRIN 81 MG/1
TABLET ORAL
Qty: 60 TAB | Refills: 0 | Status: SHIPPED | OUTPATIENT
Start: 2018-01-01 | End: 2018-01-01

## 2018-01-01 RX ORDER — PEN NEEDLE, DIABETIC 31 GX3/16"
NEEDLE, DISPOSABLE MISCELLANEOUS
Qty: 200 PEN NEEDLE | Refills: 3 | Status: SHIPPED | OUTPATIENT
Start: 2018-01-01 | End: 2018-01-01

## 2018-01-01 RX ORDER — ISOSORBIDE MONONITRATE 30 MG/1
30 TABLET, EXTENDED RELEASE ORAL DAILY
Qty: 60 TAB | Refills: 3 | Status: SHIPPED | OUTPATIENT
Start: 2018-01-01

## 2018-01-01 RX ORDER — ISOSORBIDE MONONITRATE 30 MG/1
30 TABLET, EXTENDED RELEASE ORAL
COMMUNITY
Start: 2014-09-08 | End: 2018-01-01 | Stop reason: SDUPTHER

## 2018-01-01 RX ORDER — PENTOXIFYLLINE 400 MG/1
TABLET, EXTENDED RELEASE ORAL
Qty: 90 TAB | Refills: 0 | Status: SHIPPED | OUTPATIENT
Start: 2018-01-01 | End: 2018-01-01 | Stop reason: SDUPTHER

## 2018-01-01 RX ORDER — CARVEDILOL 12.5 MG/1
12.5 TABLET ORAL 2 TIMES DAILY WITH MEALS
Qty: 180 TAB | Refills: 3 | Status: SHIPPED | OUTPATIENT
Start: 2018-01-01

## 2018-01-01 RX ORDER — AMLODIPINE BESYLATE 10 MG/1
TABLET ORAL
Qty: 60 TAB | Refills: 3
Start: 2018-01-01

## 2018-01-01 RX ORDER — FUROSEMIDE 40 MG/1
TABLET ORAL
Qty: 60 TAB | Refills: 3 | Status: SHIPPED | OUTPATIENT
Start: 2018-01-01 | End: 2018-01-01 | Stop reason: SDUPTHER

## 2018-01-01 RX ORDER — BLOOD SUGAR DIAGNOSTIC
STRIP MISCELLANEOUS
Qty: 100 STRIP | Refills: 0 | Status: SHIPPED | COMMUNITY
Start: 2018-01-01 | End: 2018-01-01

## 2018-01-01 RX ORDER — ASPIRIN 81 MG/1
TABLET ORAL
Qty: 60 TAB | Refills: 0 | Status: SHIPPED | OUTPATIENT
Start: 2018-01-01 | End: 2018-01-01 | Stop reason: SDUPTHER

## 2018-01-01 RX ORDER — FERRIC CITRATE 210 MG/1
210 TABLET, COATED ORAL
Refills: 5 | COMMUNITY
Start: 2018-01-01

## 2018-01-01 RX ORDER — LISINOPRIL 20 MG/1
TABLET ORAL
Qty: 120 TAB | Refills: 5 | Status: SHIPPED | OUTPATIENT
Start: 2018-01-01

## 2018-01-01 RX ORDER — AMLODIPINE BESYLATE 10 MG/1
TABLET ORAL
Qty: 60 TAB | Refills: 0 | Status: SHIPPED | OUTPATIENT
Start: 2018-01-01 | End: 2018-01-01 | Stop reason: SDUPTHER

## 2018-01-01 RX ORDER — OMEPRAZOLE 20 MG/1
CAPSULE, DELAYED RELEASE ORAL
Qty: 60 CAP | Refills: 0 | Status: SHIPPED | OUTPATIENT
Start: 2018-01-01 | End: 2018-01-01

## 2018-01-01 RX ORDER — FUROSEMIDE 40 MG/1
TABLET ORAL
Qty: 90 TAB | Refills: 3 | Status: SHIPPED | OUTPATIENT
Start: 2018-01-01

## 2018-01-01 RX ORDER — HYDRALAZINE HYDROCHLORIDE 25 MG/1
50 TABLET, FILM COATED ORAL 3 TIMES DAILY
Qty: 270 TAB | Refills: 3
Start: 2018-01-01 | End: 2018-01-01 | Stop reason: DRUGHIGH

## 2018-01-01 RX ORDER — FOLIC ACID 1 MG/1
TABLET ORAL
Qty: 90 TAB | Refills: 3 | Status: SHIPPED | OUTPATIENT
Start: 2018-01-01

## 2018-01-01 RX ORDER — MULTIVITAMIN
TABLET ORAL
Qty: 30 TAB | Refills: 2 | Status: SHIPPED | OUTPATIENT
Start: 2018-01-01

## 2018-01-01 RX ORDER — ATORVASTATIN CALCIUM 80 MG/1
TABLET, FILM COATED ORAL
Qty: 30 TAB | Refills: 6 | Status: SHIPPED | OUTPATIENT
Start: 2018-01-01

## 2018-01-01 RX ORDER — CLOPIDOGREL BISULFATE 75 MG/1
75 TABLET ORAL DAILY
Qty: 90 TAB | Refills: 3 | Status: SHIPPED | OUTPATIENT
Start: 2018-01-01

## 2018-01-01 RX ORDER — INSULIN LISPRO 100 [IU]/ML
INJECTION, SUSPENSION SUBCUTANEOUS
Qty: 3 PEN | Refills: 3 | Status: ON HOLD | OUTPATIENT
Start: 2018-01-01 | End: 2018-01-01 | Stop reason: ALTCHOICE

## 2018-01-01 RX ORDER — ASPIRIN 81 MG/1
TABLET ORAL
Qty: 60 TAB | Refills: 3 | Status: SHIPPED | OUTPATIENT
Start: 2018-01-01

## 2018-01-01 RX ORDER — AMLODIPINE BESYLATE 10 MG/1
TABLET ORAL
Qty: 60 TAB | Refills: 3 | Status: SHIPPED | OUTPATIENT
Start: 2018-01-01 | End: 2018-01-01 | Stop reason: SDUPTHER

## 2018-02-15 NOTE — PROGRESS NOTES
ROOM # [id number]    Betito Moreno presents today for   Chief Complaint   Patient presents with    Diabetes    Hypertension       Betito Moreno preferred language for health care discussion is english/other. Is someone accompanying this pt? no    Is the patient using any DME equipment during OV? no    Depression Screening:  PHQ over the last two weeks 2/15/2018 6/13/2017 6/2/2017 5/16/2017 5/2/2017 12/8/2016 8/31/2016   Little interest or pleasure in doing things Not at all Not at all Not at all Nearly every day Not at all Not at all Not at all   Feeling down, depressed or hopeless Not at all Not at all Not at all Several days Not at all Not at all Not at all   Total Score PHQ 2 0 0 0 4 0 0 0       Learning Assessment:  Learning Assessment 6/8/2015 11/5/2013   PRIMARY LEARNER Patient Patient   HIGHEST LEVEL OF EDUCATION - PRIMARY LEARNER  GRADUATED HIGH SCHOOL OR GED GRADUATED HIGH SCHOOL OR GED   PRIMARY [address]    NEED - No   LEARNER PREFERENCE PRIMARY DEMONSTRATION VIDEOS   LEARNING SPECIAL TOPICS - no   ANSWERED BY patient patient   RELATIONSHIP SELF SELF       Abuse Screening:  Abuse Screening Questionnaire 6/2/2017 5/11/2016 6/8/2015 3/4/2015   Do you ever feel afraid of your partner? N N N N   Are you in a relationship with someone who physically or mentally threatens you? N N N N   Is it safe for you to go home? Bianca Swenson       Fall Risk  Fall Risk Assessment, last 12 mths 2/15/2018 6/13/2017 6/2/2017 5/2/2017 12/8/2016 8/31/2016 6/14/2016   Able to walk? Yes Yes Yes Yes Yes Yes Yes   Fall in past 12 months? No No No No No No No       Health Maintenance reviewed and discussed per provider. Yes    Betito Moreno is due for pneu, lipid, microalbumin, a1c (pend labs). Please order/place referral if appropriate. Pt currently taking Antiplatelet therapy? Yes aspirin    Advance Directive:  1. Do you have an advance directive in place? Patient Reply: no    2.  If not, would you like material regarding how to put one in place? Patient Reply: no      Coordination of Care:  1. Have you been to the ER, urgent care clinic since your last visit? Hospitalized since your last visit? no    2. Have you seen or consulted any other health care providers outside of the [address] [place] [address] since your last visit? Include any pap smears or colon screening. no    Please see Red banners under Allergies, Med rec, Immunizations to remove outside inquires. All correct information has been verified with patient and added to chart.

## 2018-02-15 NOTE — PROGRESS NOTES
HISTORY OF PRESENT ILLNESS  Cora Saenz is a 77 y.o. male. HPI Comments: 78 yo male here for f/u of HTN, DM. Being followed by nephrology for ESRD. He is not interested in fistula/dialysis at this time as he feels well. HTN: He has hydralazine ordered 25mg TID but he takes all three pills at one time in the morning. BP has been elevated at home as well with SBP 160s. DM is doing well on home monitoring, typically < 100. Review of Systems   Constitutional: Negative for chills, fever and weight loss. HENT: Negative for congestion and ear pain. Eyes: Negative for blurred vision and pain. Respiratory: Negative for cough and shortness of breath. Cardiovascular: Negative for chest pain, palpitations and leg swelling. Gastrointestinal: Negative for nausea and vomiting. Genitourinary: Negative for frequency and urgency. Musculoskeletal: Negative for joint pain and myalgias. Skin: Negative for itching and rash. Neurological: Negative for dizziness, tingling and headaches. Psychiatric/Behavioral: Negative for depression. The patient is not nervous/anxious.       Past Medical History:   Diagnosis Date    NAZARIO (acute kidney injury) ([facility] [address]     Anemia in chronic kidney disease     Arthritis     ASCVD (arteriosclerotic cardiovascular disease)     Benign hypertensive kidney disease     Chronic kidney disease     STAGE 3    CKD (chronic kidney disease) stage 2, GFR 60-89 ml/min 4/18/2013    CKD (chronic kidney disease), stage III     Diabetes mellitus with chronic kidney disease ([facility] [address]     Dialysis patient ([facility] [address] 09/2014    NOT CURRENTLY ON DIALYSIS    Dyslipidemia     GERD (gastroesophageal reflux disease)     Glaucoma     Heart attack 08/2014    Hypertension     Left renal artery stenosis (HCC)     NSTEMI (non-ST elevated myocardial infarction) ([address]     PAD (peripheral artery disease) (HCC)     PAF (paroxysmal atrial fibrillation) ([address] 9/2014    Pulmonary asbestosis ([address]     Renal mass, right     Stroke [facility])     TIA     Physical Exam   Constitutional: He appears well-developed and well-nourished. No distress. /56 (BP 1 Location: Left arm, BP Patient Position: Sitting)  Pulse 71  Temp 97 °F (36.1 °C) (Oral)   Resp 14  Ht 5' 7\" (1.702 m)  Wt 140 lb (63.5 kg)  SpO2 99%  BMI 21.93 kg/m2     Eyes: EOM are normal. Right eye exhibits no discharge. Left eye exhibits no discharge. No scleral icterus. Neck: Neck supple. Cardiovascular: Normal rate, regular rhythm and normal heart sounds. Exam reveals no gallop and no friction rub. No murmur heard. Pulmonary/Chest: Effort normal and breath sounds normal. No respiratory distress. He has no wheezes. He has no rales. Musculoskeletal: He exhibits no edema or tenderness. Lymphadenopathy:     He has no cervical adenopathy. Neurological: He is alert. He exhibits normal muscle tone. Skin: Skin is warm and dry. Psychiatric: He has a normal mood and affect. Lab Results   Component Value Date/Time    Sodium 136 01/10/2018 09:55 AM    Potassium 3.7 01/10/2018 09:55 AM    Chloride 101 01/10/2018 09:55 AM    CO2 25 01/10/2018 09:55 AM    Glucose 93 01/10/2018 09:55 AM    BUN 71 (H) 01/10/2018 09:55 AM    Creatinine 4.6 (H) 01/10/2018 09:55 AM    BUN/Creatinine ratio 18 03/04/2015 02:14 PM    GFR est AA 16.0 01/10/2018 09:55 AM    GFR est non-AA 13 01/10/2018 09:55 AM    Calcium 9.2 01/10/2018 09:55 AM    Bilirubin, total 0.6 08/21/2014 02:41 PM    AST (SGOT) 29 08/21/2014 02:41 PM    Alk. phosphatase 85 08/21/2014 02:41 PM    Protein, total 6.5 08/21/2014 02:41 PM    Albumin 3.5 01/10/2018 09:55 AM    A-G Ratio 1.6 08/21/2014 02:41 PM    ALT (SGPT) 36 08/21/2014 02:41 PM     Lab Results   Component Value Date/Time    Hemoglobin A1c 7.4 (H) 07/21/2017 02:13 PM    Hemoglobin A1c (POC) 7.5 04/20/2017 08:15 AM       ASSESSMENT and PLAN    ICD-10-CM ICD-9-CM    1. Essential hypertension I10 401.9    2.  Diabetic nephropathy associated with type 2 diabetes mellitus (HCC) E11.21 250.40      583.81      BP not well controlled. Will change hydralazine to 50 mg BID, increase to TID if needed. DM doing well on home monitoring. He prefers to hold on labs today as he states they are planned with his nephrologist next month.

## 2018-02-15 NOTE — PATIENT INSTRUCTIONS
Diet for Chronic Kidney Disease (Before Dialysis): Care Instructions  Your Care Instructions  When you have chronic kidney disease, you need to change your diet to avoid foods that make your kidneys worse. You may need to limit salt, fluids, and protein. You also may need to limit minerals such as potassium and phosphorus. A diet for chronic kidney disease takes planning. A dietitian who specializes in kidney disease can help you plan meals that meet your needs. These guidelines are for people who are not on dialysis. Talk with your doctor or dietitian to make sure your diet is right for your condition. Do not change your diet without talking to your doctor or dietitian. Follow-up care is a key part of your treatment and safety. Be sure to make and go to all appointments, and call your doctor if you are having problems. It's also a good idea to know your test results and keep a list of the medicines you take. How can you care for yourself at home? · Work with your doctor or dietitian to create a food plan. · Do not skip meals or go for many hours without eating. If you do not feel very hungry, try to eat 4 or 5 small meals instead of 1 or 2 big meals. · If you have a hard time eating enough, talk to your doctor or dietitian about ways you can add calories to your diet. · Do not take any vitamins or minerals, supplements, or herbal products without talking to your doctor first.  · Check with your doctor about whether it is safe for you to drink alcohol. To get the right amount of protein  · Ask your doctor or dietitian how much protein you can have each day. Most people with chronic kidney disease need to limit the amount of protein they eat. But you still need some protein to stay healthy. · Include all sources of protein in your daily protein count. Besides meat, poultry, fish, and eggs, protein is found in milk and milk products, beans and nuts, breads, cereals, and vegetables.   To limit salt  · Read food labels on cans and food packages. The labels tell you how much sodium is in each serving. Make sure that you look at the serving size. If you eat more than the serving size, you will get more sodium than what is listed on the label. · Do not add salt to your food. Avoid foods that list salt, sodium, or monosodium glutamate (MSG) as an ingredient. · Buy foods that are labeled \"no salt added,\" \"sodium-free,\" or \"low-sodium. \" Foods labeled \"reduced-sodium\" and \"light sodium\" may still have too much sodium. · Limit processed foods, fast food, and restaurant foods. These types of food are very high in sodium. · Avoid salted pretzels, chips, popcorn, and other salted snacks. · Avoid smoked, cured, salted, and canned meat, fish, and poultry. This includes ham, bryant, hot dogs, and luncheon meats. · You may use lemon, herbs, and spices to flavor your meals. To limit potassium  · Choose low-potassium fruits such as applesauce, pineapple, grapes, blueberries, and raspberries. · Choose low-potassium vegetables such as lettuce, green beans, cucumber, and radishes. · Choose low-potassium foods such as hummus, spaghetti, macaroni, rice, tortillas, and bagels. · Limit or avoid high-potassium foods such as milk, bananas, oranges, cantaloupe, potatoes, spinach, tomatoes, broccoli, and sweet potatoes. · Do not use a salt substitute or lite salt unless your doctor says it is okay. Most salt substitutes and lite salts are high in potassium. To limit phosphorus  · Follow your food plan to know how much milk and milk products you can have. · Limit nuts, peanut butter, seeds, lentils, beans, organ meats, and sardines. · Avoid cola drinks. · Avoid bran breads or bran cereals. If you need to limit fluids  · Know how much fluid you can drink. Every day fill a pitcher with that amount of water. If you drink another fluid (such as coffee) that day, pour an equal amount of water out of the pitcher.   · Count foods that are liquid at room temperature, such as gelatin dessert and ice cream, as fluids. Where can you learn more? Go to http://melissa-carter.info/. Enter N565 in the search box to learn more about \"Diet for Chronic Kidney Disease (Before Dialysis): Care Instructions. \"  Current as of: May 12, 2017  Content Version: 11.4  © 0992-8092 [employer]. Care instructions adapted under license by [employer] (which disclaims liability or warranty for this information). If you have questions about a medical condition or this instruction, always ask your healthcare professional. [address] [address] any warranty or liability for your use of this information.

## 2018-02-15 NOTE — MR AVS SNAPSHOT
[address] [place] [address] 
 
 
 [address] [facility] [id number] 
[phone] Patient: Patricia Dsouza MRN: [id number] FPO:7/33/3129 Visit Information Date & Time Provider Department Dept. Phone Encounter #  
 2/15/2018 11:15 [phone] [id number] Follow-up Instructions Return in about 4 months (around 6/15/2018) for hypertension, diabetes, Medicare Wellness Visit. Upcoming Health Maintenance Date Due Pneumococcal 65+ Low/Medium Risk (2 of 2 - PPSV23) 11/11/2010 LIPID PANEL Q1 8/31/2017 MICROALBUMIN Q1 12/6/2017 HEMOGLOBIN A1C Q6M 1/21/2018 FOOT EXAM Q1 4/20/2018 EYE EXAM RETINAL OR DILATED Q1 5/18/2018 MEDICARE YEARLY EXAM 6/3/2018 GLAUCOMA SCREENING Q2Y 5/18/2019 COLONOSCOPY 7/8/2021 DTaP/Tdap/Td series (2 - Td) 6/2/2027 Allergies as of 2/15/2018  Review Complete On: 2/15/2018 By: Delmar Palma Severity Noted Reaction Type Reactions Iodinated Contrast- Oral And Iv Dye  06/07/2017    Other (comments) PT HAS RENAL DISEASE AND IS UNABLE TO TOLERATE Lisinopril  11/20/2014    Other (comments) PT IS CURRENTLY TAKING THIS MEDICATION. IS NOT ALLERGIC Tramadol  06/07/2017   Systemic Other (comments) BRADYCARDIA Current Immunizations  Reviewed on 11/15/2013 Name Date Influenza High Dose Vaccine PF 11/3/2017, 10/1/2015 Influenza Vaccine Split 11/1/2012 Pneumococcal Vaccine (Unspecified Type) 11/11/2005 Not reviewed this visit You Were Diagnosed With   
  
 Codes Comments Essential hypertension    -  Primary ICD-10-CM: I10 
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 
ICD-9-CM: 250.40, 583.81 Vitals BP Pulse Temp Resp Height(growth percentile) Weight(growth percentile)  175/56 (BP 1 Location: Left arm, BP Patient Position: Sitting) 71 97 °F (36.1 °C) (Oral) 14 5' 7\" (1.702 m) 140 lb (63.5 kg) SpO2 BMI Smoking Status 99% 21.93 kg/m2 Former Smoker Vitals History BMI and BSA Data Body Mass Index Body Surface Area  
 21.93 kg/m 2 1.73 m 2 Preferred Pharmacy Pharmacy Name Phone [address] [address] [id number] [phone] Your Updated Medication List  
  
   
This list is accurate as of: 2/15/18 11:52 AM.  Always use your most recent med list. amLODIPine 10 mg tablet Commonly known as:  [place] Pique TAKE 1 TABLET BY MOUTH EVERY DAY  
  
 aspirin delayed-release 81 mg tablet TAKE 1 TABLET BY MOUTH EVERY DAY  
  
 atorvastatin 80 mg tablet Commonly known as:  LIPITOR  
TAKE 1 TABLET BY MOUTH EVERY NIGHT AT BEDTIME  
  
 AURYXIA 210 mg iron tablet Generic drug:  ferric citrate Take  by mouth three (3) times daily. calcitRIOL 0.25 mcg capsule Commonly known as:  ROCALTROL TK 1 C PO D  
  
 * CALCIUM 600 WITH VITAMIN D3 600 mg(1,500mg) -400 unit Cap Generic drug:  Calcium-Cholecalciferol (D3) Take 1 Cap by mouth daily. * Calcium 600 + D tablet Generic drug:  calcium-cholecalciferol (D3)  
TAKE 1 TABLET BY MOUTH EVERY DAY  
  
 folic acid 1 mg tablet Commonly known as:  FOLVITE  
TAKE 1 TABLET BY MOUTH EVERY DAY  
  
 furosemide 40 mg tablet Commonly known as:  LASIX TAKE 1 TABLET BY MOUTH EVERY DAY  
  
 hydrALAZINE 25 mg tablet Commonly known as:  APRESOLINE Take 2 Tabs by mouth three (3) times daily. Indications: hypertension * insulin lispro  & lisp protamine (human) 100 unit/mL (75-25) Inpn Commonly known as:  HumaLOG Mix 75-25 KwikPen 10 units am and 8 units pm  
  
 * HumaLOG Mix 75-25 KwikPen 100 unit/mL (75-25) Inpn Generic drug:  insulin lispro  & lisp protamine (human) INJECT 10 UNITS UNDER THE SKIN EVERY MORNING AND 8 UNITS EVERY EVENING  
  
 isosorbide mononitrate ER 30 mg tablet Commonly known as:  IMDUR  
1qd  
  
 lisinopril 20 mg tablet Commonly known as:  PRINIVIL, ZESTRIL  
TAKE 2 TABLETS BY MOUTH EVERY DAY Rose Pen Needle 32 gauge x 5/32\" Ndle Generic drug:  Insulin Needles (Disposable) USE TWICE DAILY AS DIRECTED  
  
 omeprazole 20 mg capsule Commonly known as:  PRILOSEC  
TAKE 1 CAPSULE BY MOUTH EVERY DAY pentoxifylline  mg CR tablet Commonly known as:  TRENTAL TAKE 1 TABLET BY MOUTH THREE TIMES DAILY * Notice: This list has 4 medication(s) that are the same as other medications prescribed for you. Read the directions carefully, and ask your doctor or other care provider to review them with you. Follow-up Instructions Return in about 4 months (around 6/15/2018) for hypertension, diabetes, Medicare Wellness Visit. Patient Instructions Diet for Chronic Kidney Disease (Before Dialysis): Care Instructions Your Care Instructions When you have chronic kidney disease, you need to change your diet to avoid foods that make your kidneys worse. You may need to limit salt, fluids, and protein. You also may need to limit minerals such as potassium and phosphorus. A diet for chronic kidney disease takes planning. A dietitian who specializes in kidney disease can help you plan meals that meet your needs. These guidelines are for people who are not on dialysis. Talk with your doctor or dietitian to make sure your diet is right for your condition. Do not change your diet without talking to your doctor or dietitian. Follow-up care is a key part of your treatment and safety. Be sure to make and go to all appointments, and call your doctor if you are having problems. It's also a good idea to know your test results and keep a list of the medicines you take. How can you care for yourself at home? · Work with your doctor or dietitian to create a food plan. · Do not skip meals or go for many hours without eating. If you do not feel very hungry, try to eat 4 or 5 small meals instead of 1 or 2 big meals. · If you have a hard time eating enough, talk to your doctor or dietitian about ways you can add calories to your diet. · Do not take any vitamins or minerals, supplements, or herbal products without talking to your doctor first. 
· Check with your doctor about whether it is safe for you to drink alcohol. To get the right amount of protein · Ask your doctor or dietitian how much protein you can have each day. Most people with chronic kidney disease need to limit the amount of protein they eat. But you still need some protein to stay healthy. · Include all sources of protein in your daily protein count. Besides meat, poultry, fish, and eggs, protein is found in milk and milk products, beans and nuts, breads, cereals, and vegetables. To limit salt · Read food labels on cans and food packages. The labels tell you how much sodium is in each serving. Make sure that you look at the serving size. If you eat more than the serving size, you will get more sodium than what is listed on the label. · Do not add salt to your food. Avoid foods that list salt, sodium, or monosodium glutamate (MSG) as an ingredient. · Buy foods that are labeled \"no salt added,\" \"sodium-free,\" or \"low-sodium. \" Foods labeled \"reduced-sodium\" and \"light sodium\" may still have too much sodium. · Limit processed foods, fast food, and restaurant foods. These types of food are very high in sodium. · Avoid salted pretzels, chips, popcorn, and other salted snacks. · Avoid smoked, cured, salted, and canned meat, fish, and poultry. This includes ham, bryant, hot dogs, and luncheon meats. · You may use lemon, herbs, and spices to flavor your meals. To limit potassium · Choose low-potassium fruits such as applesauce, pineapple, grapes, blueberries, and raspberries. · Choose low-potassium vegetables such as lettuce, green beans, cucumber, and radishes. · Choose low-potassium foods such as hummus, spaghetti, macaroni, rice, tortillas, and bagels. · Limit or avoid high-potassium foods such as milk, bananas, oranges, cantaloupe, potatoes, spinach, tomatoes, broccoli, and sweet potatoes. · Do not use a salt substitute or lite salt unless your doctor says it is okay. Most salt substitutes and lite salts are high in potassium. To limit phosphorus · Follow your food plan to know how much milk and milk products you can have. · Limit nuts, peanut butter, seeds, lentils, beans, organ meats, and sardines. · Avoid cola drinks. · Avoid bran breads or bran cereals. If you need to limit fluids · Know how much fluid you can drink. Every day fill a pitcher with that amount of water. If you drink another fluid (such as coffee) that day, pour an equal amount of water out of the pitcher. · Count foods that are liquid at room temperature, such as gelatin dessert and ice cream, as fluids. Where can you learn more? Go to http://melissa-carter.info/. Enter U733 in the search box to learn more about \"Diet for Chronic Kidney Disease (Before Dialysis): Care Instructions. \" Current as of: May 12, 2017 Content Version: 11.4 © 0347-2396 [employer]. Care instructions adapted under license by [employer] (which disclaims liability or warranty for this information). If you have questions about a medical condition or this instruction, always ask your healthcare professional. [address] [place] [address] any warranty or liability for your use of this information. Introducing [facility]! Bill Oliver introduces [facility] patient portal. Now you can access parts of your medical record, email your doctor's office, and request medication refills online. 1. In your internet browser, go to https://[employer] 2. Click on the First Time User? Click Here link in the Sign In box. You will see the New Member Sign Up page. 3. Enter your [employer] Access Code exactly as it appears below. You will not need to use this code after youve completed the sign-up process. If you do not sign up before the expiration date, you must request a new code. · [employer] Access Code: [email or web address] Expires: 5/16/2018 11:52 AM 
 
4. Enter the last four digits of your Social Security Number (xxxx) and Date of Birth (mm/dd/yyyy) as indicated and click Submit. You will be taken to the next sign-up page. 5. Create a SocioSquare ID. This will be your SocioS[employer] login ID and cannot be changed, so think of one that is secure and easy to remember. 6. Create a [employer] password. You can change your password at any time. 7. Enter your Password Reset Question and Answer. This can be used at a later time if you forget your password. 8. Enter your e-mail address. You will receive e-mail notification when new information is available in [address] 9. Click Sign Up. You can now view and download portions of your medical record. 10. Click the Download Summary menu link to download a portable copy of your medical information. If you have questions, please visit the Frequently Asked Questions section of the [employer] website. Remember, [employer] is NOT to be used for urgent needs. For medical emergencies, dial 911. Now available from your iPhone and Android! Please provide this summary of care documentation to your next provider. Your primary care clinician is listed as [address] [place] [address] Ave. If you have any questions after today's visit, please call [phone].

## 2018-05-21 PROBLEM — N18.4 CKD (CHRONIC KIDNEY DISEASE) STAGE 4, GFR 15-29 ML/MIN (HCC): Status: ACTIVE | Noted: 2018-01-01

## 2018-05-21 NOTE — PROGRESS NOTES
ROOM # [id number]  Identified pt with two pt identifiers(name and ). Reviewed record in preparation for visit and have obtained necessary documentation. Chief Complaint   Patient presents with    Follow-up     dm,htn      Houston Osler preferred language for health care discussion is english/other. Is the patient using any DME equipment during OV? NO    Temo Aroraflory is due for:  Health Maintenance Due   Topic    Pneumococcal 65+ Low/Medium Risk (2 of 2 - PPSV23)    LIPID PANEL Q1     MICROALBUMIN Q1     HEMOGLOBIN A1C Q6M     FOOT EXAM Q1     EYE EXAM RETINAL OR DILATED Q1      Health Maintenance reviewed and discussed per provider  Please order/place referral if appropriate. Advance Directive:  1. Do you have an advance directive in place? Patient Reply: NO    2. If not, would you like material regarding how to put one in place? YES    Coordination of Care:  1. Have you been to the ER, urgent care clinic since your last visit? Hospitalized since your last visit? NO    2. Have you seen or consulted any other health care providers outside of the [address] [place] [address] since your last visit? Include any pap smears or colon screening. NO    Patient is accompanied by self I have received verbal consent from Houston Osler to discuss any/all medical information while they are present in the room.     Learning Assessment:  Learning Assessment 2015   PRIMARY LEARNER Patient Patient   HIGHEST LEVEL OF EDUCATION - PRIMARY LEARNER  GRADUATED HIGH SCHOOL OR GED GRADUATED HIGH SCHOOL OR GED   PRIMARY LANGUAGE ENGLISH ENGLISH    NEED - No   LEARNER PREFERENCE PRIMARY DEMONSTRATION VIDEOS   LEARNING SPECIAL TOPICS - no   ANSWERED BY patient patient   RELATIONSHIP SELF SELF     Depression Screening:  PHQ over the last two weeks 2018 2/15/2018 2017 2017 2017 2017 2016   Little interest or pleasure in doing things Not at all Not at all Not at all Not at all Nearly every day Not at all Not at all   Feeling down, depressed or hopeless Not at all Not at all Not at all Not at all Several days Not at all Not at all   Total Score PHQ 2 0 0 0 0 4 0 0     Abuse Screening:  Abuse Screening Questionnaire 6/2/2017 5/11/2016 6/8/2015 3/4/2015   Do you ever feel afraid of your partner? N N N N   Are you in a relationship with someone who physically or mentally threatens you? N N N N   Is it safe for you to go home? Olivia Parham     Fall Risk  Fall Risk Assessment, last 12 mths 5/21/2018 2/15/2018 6/13/2017 6/2/2017 5/2/2017 12/8/2016 8/31/2016   Able to walk? Yes Yes Yes Yes Yes Yes Yes   Fall in past 12 months?  No No No No No No No

## 2018-05-21 NOTE — MR AVS SNAPSHOT
[address] [place] [address] 
 
 
 [address] [facility] [address] [address] 
[phone] Patient: Claudette Tejada MRN: [id number] Visit Information Date & Time Provider Department Dept. Phone Encounter #  
 5/21/2018  9:45 AM [facility] [phone] [id number] Follow-up Instructions Return in about 1 month (around 6/21/2018) for Medicare Wellness Visit, hypertension. Upcoming Health Maintenance Date Due Pneumococcal 65+ Low/Medium Risk (2 of 2 - PPSV23) 11/11/2010 LIPID PANEL Q1 8/31/2017 MICROALBUMIN Q1 12/6/2017 FOOT EXAM Q1 4/20/2018 EYE EXAM RETINAL OR DILATED Q1 5/18/2018 MEDICARE YEARLY EXAM 6/3/2018 Influenza Age 5 to Adult 8/1/2018 HEMOGLOBIN A1C Q6M 11/21/2018 GLAUCOMA SCREENING Q2Y 5/18/2019 COLONOSCOPY 7/8/2021 DTaP/Tdap/Td series (2 - Td) 6/2/2027 Allergies as of 5/21/2018  Review Complete On: 5/21/2018 By: Perla Lorenzo Severity Noted Reaction Type Reactions Iodinated Contrast- Oral And Iv Dye  06/07/2017    Other (comments) PT HAS RENAL DISEASE AND IS UNABLE TO TOLERATE Lisinopril  11/20/2014    Other (comments) PT IS CURRENTLY TAKING THIS MEDICATION. IS NOT ALLERGIC Tramadol  06/07/2017   Systemic Other (comments) BRADYCARDIA Current Immunizations  Reviewed on 11/15/2013 Name Date Influenza High Dose Vaccine PF 11/3/2017, 10/1/2015 Influenza Vaccine Split 11/1/2012 Pneumococcal Vaccine (Unspecified Type) 11/11/2005 Not reviewed this visit You Were Diagnosed With   
  
 Codes Comments Essential hypertension    -  Primary ICD-10-CM: I10 
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   
 CKD (chronic kidney disease) stage 4, GFR 15-29 ml/min ([facility])     ICD-10-CM: N18.4 ICD-9-CM: 585.4 Drug-induced constipation     ICD-10-CM: K59.03 
ICD-9-CM: 564.09, E980.5 Vitals BP Pulse Temp Resp Height(growth percentile) Weight(growth percentile)  
 198/68 (BP 1 Location: Right arm, BP Patient Position: Sitting) 65 98.5 °F (36.9 °C) (Oral) 18 5' 7\" (1.702 m) 142 lb 9.6 oz (64.7 kg) SpO2 BMI Smoking Status 99% 22.33 kg/m2 Former Smoker Vitals History BMI and BSA Data Body Mass Index Body Surface Area  
 22.33 kg/m 2 1.75 m 2 Preferred Pharmacy Pharmacy Name Phone [address] [place] [address] [address] [place] [phone] Your Updated Medication List  
  
   
This list is accurate as of 5/21/18 10:06 AM.  Always use your most recent med list. amLODIPine 10 mg tablet Commonly known as:  Aledo Railing TAKE 1 TABLET BY MOUTH EVERY DAY  
  
 aspirin delayed-release 81 mg tablet TAKE 1 TABLET BY MOUTH EVERY DAY  
  
 atorvastatin 80 mg tablet Commonly known as:  LIPITOR  
TAKE 1 TABLET BY MOUTH EVERY NIGHT AT BEDTIME  
  
 AURYXIA 210 mg iron tablet Generic drug:  ferric citrate Take  by mouth three (3) times daily. calcium-cholecalciferol (D3) tablet Commonly known as:  Calcium 600 + D  
TAKE 1 TABLET BY MOUTH EVERY DAY  
  
 docusate sodium 50 mg capsule Commonly known as:  Bessy Busing Take 1 Cap by mouth two (2) times daily as needed for Constipation. folic acid 1 mg tablet Commonly known as:  FOLVITE  
TAKE 1 TABLET BY MOUTH EVERY DAY  
  
 furosemide 40 mg tablet Commonly known as:  LASIX TAKE 1 TABLET BY MOUTH EVERY DAY  
  
 hydrALAZINE 25 mg tablet Commonly known as:  APRESOLINE Take 2 Tabs by mouth three (3) times daily. Indications: hypertension * insulin lispro  & lisp protamine (human) 100 unit/mL (75-25) Inpn Commonly known as:  HumaLOG Mix 75-25 KwikPen 10 units am and 8 units pm  
  
 * HumaLOG Mix 75-25 KwikPen 100 unit/mL (75-25) Inpn Generic drug:  insulin lispro  & lisp protamine (human) INJECT 10 UNITS UNDER THE SKIN EVERY MORNING AND 8 UNITS EVERY EVENING  
  
 isosorbide mononitrate ER 30 mg tablet Commonly known as:  IMDUR  
30 mg.  
  
 lisinopril 20 mg tablet Commonly known as:  PRINIVIL, ZESTRIL  
TAKE 2 TABLETS BY MOUTH EVERY DAY Rose Pen Needle 32 gauge x 5/32\" Ndle Generic drug:  Insulin Needles (Disposable) USE TWICE DAILY AS DIRECTED  
  
 omeprazole 20 mg capsule Commonly known as:  PRILOSEC  
TAKE 1 CAPSULE BY MOUTH EVERY DAY * pentoxifylline  mg CR tablet Commonly known as:  TRENTAL TAKE 1 TABLET BY MOUTH THREE TIMES DAILY * pentoxifylline  mg CR tablet Commonly known as:  TRENTAL TAKE 1 TABLET BY MOUTH THREE TIMES DAILY * Notice: This list has 4 medication(s) that are the same as other medications prescribed for you. Read the directions carefully, and ask your doctor or other care provider to review them with you. Prescriptions Sent to Pharmacy Refills  
 calcium-cholecalciferol, D3, (CALCIUM 600 + D) tablet 2 Sig: TAKE 1 TABLET BY MOUTH EVERY DAY Class: Normal  
 [facility] [address] [facility] Ph #: [phone]  
 aspirin delayed-release 81 mg tablet 0 Sig: TAKE 1 TABLET BY MOUTH EVERY DAY Class: Normal  
 [email or web address] [address] [facility] Ph #: [phone]  
 docusate sodium (COLACE) 50 mg capsule 5 Sig: Take 1 Cap by mouth two (2) times daily as needed for Constipation. Class: Normal  
 Pharmacy: [facility] [address] [facility] [address] [facility] Ph #: [phone]  Route: Oral  
  
 We Performed the Following AMB POC HEMOGLOBIN A1C [74049 CPT(R)] Follow-up Instructions Return in about 1 month (around 6/21/2018) for Medicare Wellness Visit, hypertension. Patient Instructions Learning About Diabetes Food Guidelines Your Care Instructions Meal planning is important to manage diabetes. It helps keep your blood sugar at a target level (which you set with your doctor). You don't have to eat special foods. You can eat what your family eats, including sweets once in a while. But you do have to pay attention to how often you eat and how much you eat of certain foods. You may want to work with a dietitian or a certified diabetes educator (CDE) to help you plan meals and snacks. A dietitian or CDE can also help you lose weight if that is one of your goals. What should you know about eating carbs? Managing the amount of carbohydrate (carbs) you eat is an important part of healthy meals when you have diabetes. Carbohydrate is found in many foods. · Learn which foods have carbs. And learn the amounts of carbs in different foods. ¨ Bread, cereal, pasta, and rice have about 15 grams of carbs in a serving. A serving is 1 slice of bread (1 ounce), ½ cup of cooked cereal, or 1/3 cup of cooked pasta or rice. ¨ Fruits have 15 grams of carbs in a serving. A serving is 1 small fresh fruit, such as an apple or orange; ½ of a banana; ½ cup of cooked or canned fruit; ½ cup of fruit juice; 1 cup of melon or raspberries; or 2 tablespoons of dried fruit. ¨ Milk and no-sugar-added yogurt have 15 grams of carbs in a serving. A serving is 1 cup of milk or 2/3 cup of no-sugar-added yogurt. ¨ Starchy vegetables have 15 grams of carbs in a serving. A serving is ½ cup of mashed potatoes or sweet potato; 1 cup winter squash; ½ of a small baked potato; ½ cup of cooked beans; or ½ cup cooked corn or green peas.  
· Learn how much carbs to eat each day and at each meal. A dietitian or CDE can teach you how to keep track of the amount of carbs you eat. This is called carbohydrate counting. · If you are not sure how to count carbohydrate grams, use the Plate Method to plan meals. It is a good, quick way to make sure that you have a balanced meal. It also helps you spread carbs throughout the day. ¨ Divide your plate by types of foods. Put non-starchy vegetables on half the plate, meat or other protein food on one-quarter of the plate, and a grain or starchy vegetable in the final quarter of the plate. To this you can add a small piece of fruit and 1 cup of milk or yogurt, depending on how many carbs you are supposed to eat at a meal. 
· Try to eat about the same amount of carbs at each meal. Do not \"save up\" your daily allowance of carbs to eat at one meal. 
· Proteins have very little or no carbs per serving. Examples of proteins are beef, chicken, turkey, fish, eggs, tofu, cheese, cottage cheese, and peanut butter. A serving size of meat is 3 ounces, which is about the size of a deck of cards. Examples of meat substitute serving sizes (equal to 1 ounce of meat) are 1/4 cup of cottage cheese, 1 egg, 1 tablespoon of peanut butter, and ½ cup of tofu. How can you eat out and still eat healthy? · Learn to estimate the serving sizes of foods that have carbohydrate. If you measure food at home, it will be easier to estimate the amount in a serving of restaurant food. · If the meal you order has too much carbohydrate (such as potatoes, corn, or baked beans), ask to have a low-carbohydrate food instead. Ask for a salad or green vegetables. · If you use insulin, check your blood sugar before and after eating out to help you plan how much to eat in the future. · If you eat more carbohydrate at a meal than you had planned, take a walk or do other exercise. This will help lower your blood sugar. What else should you know? · Limit saturated fat, such as the fat from meat and dairy products.  This is a healthy choice because people who have diabetes are at higher risk of heart disease. So choose lean cuts of meat and nonfat or low-fat dairy products. Use olive or canola oil instead of butter or shortening when cooking. · Don't skip meals. Your blood sugar may drop too low if you skip meals and take insulin or certain medicines for diabetes. · Check with your doctor before you drink alcohol. Alcohol can cause your blood sugar to drop too low. Alcohol can also cause a bad reaction if you take certain diabetes medicines. Follow-up care is a key part of your treatment and safety. Be sure to make and go to all appointments, and call your doctor if you are having problems. It's also a good idea to know your test results and keep a list of the medicines you take. Where can you learn more? Go to http://melissa-carter.info/. Enter X122 in the search box to learn more about \"Learning About Diabetes Food Guidelines. \" Current as of: March 13, 2017 Content Version: 11.4 © 0040-2936 [employer]. Care instructions adapted under license by [employer] (which disclaims liability or warranty for this information). If you have questions about a medical condition or this instruction, always ask your healthcare professional. [address] any warranty or liability for your use of this information. Hydralazine (By mouth) Hydralazine Hydrochloride (vgc-ZBWT-v-dianne cyrus-droe-KLOR-clem) Treats high blood pressure. Brand Name(s):  
There may be other brand names for this medicine. When This Medicine Should Not Be Used: This medicine is not right for everyone. Do not use it if you had an allergic reaction to hydralazine, or if you have coronary artery disease or rheumatic heart disease. How to Use This Medicine:  
Tablet · Take your medicine as directed. Your dose may need to be changed several times to find what works best for you. · Missed dose: Take a dose as soon as you remember. If it is almost time for your next dose, wait until then and take a regular dose. Do not take extra medicine to make up for a missed dose. · Store the medicine in a closed container at room temperature, away from heat, moisture, and direct light. Drugs and Foods to Avoid: Ask your doctor or pharmacist before using any other medicine, including over-the-counter medicines, vitamins, and herbal products. · Some foods and medicines can affect how hydralazine works. Tell your doctor if you are using diazoxide or an MAO inhibitor. Warnings While Using This Medicine: · Tell your doctor if you are pregnant or breastfeeding, or if you have kidney disease, heart or blood vessel disease, heart rhythm problems, lupus, or if you had a heart attack or stroke. · This medicine may cause the following problems: 
¨ Lupus-like syndrome ¨ Changes in heart rhythm ¨ Nerve problems · This medicine may lower your blood pressure too much and cause you to feel dizzy. Do not drive or do anything else that could be dangerous until you know how this medicine affects you. · Your doctor will do lab tests at regular visits to check on the effects of this medicine. Keep all appointments. · Keep all medicine out of the reach of children. Never share your medicine with anyone. Possible Side Effects While Using This Medicine:  
Call your doctor right away if you notice any of these side effects: · Allergic reaction: Itching or hives, swelling in your face or hands, swelling or tingling in your mouth or throat, chest tightness, trouble breathing · Change in how much or how often you urinate · Chest pain that may spread to your arms, jaw, back, or neck, trouble breathing, unusual sweating, faintness · Fast, pounding, or uneven heartbeat · Fever, chills, cough, sore throat, and body aches · Lightheadedness, dizziness, or fainting · Numbness, tingling, or burning pain in your hands, arms, legs, or feet · Unusual bleeding, bruising, or weakness If you notice these less serious side effects, talk with your doctor: · Diarrhea, nausea, vomiting, loss of appetite · Headache · Stuffy nose or watery eyes If you notice other side effects that you think are caused by this medicine, tell your doctor. Call your doctor for medical advice about side effects. You may report side effects to FDA at 5-053-EDV-9933 © 2017 [address] Information is for End User's use only and may not be sold, redistributed or otherwise used for commercial purposes. The above information is an  only. It is not intended as medical advice for individual conditions or treatments. Talk to your doctor, nurse or pharmacist before following any medical regimen to see if it is safe and effective for you. Introducing [facility] HE[facility]! [facility] introduces [employer] patient portal. Now you can access parts of your medical record, email your doctor's office, and request medication refills online. 1. In your internet browser, go to [email or web address] 2. Click on the First Time User? Click Here link in the Sign In box. You will see the New Member Sign Up page. 3. Enter your [employer] Access Code exactly as it appears below. You will not need to use this code after youve completed the sign-up process. If you do not sign up before the expiration date, you must request a new code. · [employer] Access Code: [email or web address] Expires: 8/19/2018 10:02 AM 
 
4. Enter the last four digits of your Social Security Number (xxxx) and Date of Birth (mm/dd/yyyy) as indicated and click Submit. You will be taken to the next sign-up page. 5. Create a [employer] ID. This will be your [employer] login ID and cannot be changed, so think of one that is secure and easy to remember. 6. Create a [employer] password. You can change your password at any time. 7. Enter your Password Reset Question and Answer. This can be used at a later time if you forget your password. 8. Enter your e-mail address. You will receive e-mail notification when new information is available in [address] 9. Click Sign Up. You can now view and download portions of your medical record. 10. Click the Download Summary menu link to download a portable copy of your medical information. If you have questions, please visit the Frequently Asked Questions section of the [employer] website. Remember, [employer] is NOT to be used for urgent needs. For medical emergencies, dial 911. Now available from your iPhone and Android! Please provide this summary of care documentation to your next provider. Your primary care clinician is listed as Tera Mensah. If you have any questions after today's visit, please call [phone].

## 2018-05-21 NOTE — PATIENT INSTRUCTIONS
Learning About Diabetes Food Guidelines  Your Care Instructions    Meal planning is important to manage diabetes. It helps keep your blood sugar at a target level (which you set with your doctor). You don't have to eat special foods. You can eat what your family eats, including sweets once in a while. But you do have to pay attention to how often you eat and how much you eat of certain foods. You may want to work with a dietitian or a certified diabetes educator (CDE) to help you plan meals and snacks. A dietitian or CDE can also help you lose weight if that is one of your goals. What should you know about eating carbs? Managing the amount of carbohydrate (carbs) you eat is an important part of healthy meals when you have diabetes. Carbohydrate is found in many foods. · Learn which foods have carbs. And learn the amounts of carbs in different foods. ¨ Bread, cereal, pasta, and rice have about 15 grams of carbs in a serving. A serving is 1 slice of bread (1 ounce), ½ cup of cooked cereal, or 1/3 cup of cooked pasta or rice. ¨ Fruits have 15 grams of carbs in a serving. A serving is 1 small fresh fruit, such as an apple or orange; ½ of a banana; ½ cup of cooked or canned fruit; ½ cup of fruit juice; 1 cup of melon or raspberries; or 2 tablespoons of dried fruit. ¨ Milk and no-sugar-added yogurt have 15 grams of carbs in a serving. A serving is 1 cup of milk or 2/3 cup of no-sugar-added yogurt. ¨ Starchy vegetables have 15 grams of carbs in a serving. A serving is ½ cup of mashed potatoes or sweet potato; 1 cup winter squash; ½ of a small baked potato; ½ cup of cooked beans; or ½ cup cooked corn or green peas. · Learn how much carbs to eat each day and at each meal. A dietitian or CDE can teach you how to keep track of the amount of carbs you eat. This is called carbohydrate counting. · If you are not sure how to count carbohydrate grams, use the Plate Method to plan meals.  It is a good, quick way to make sure that you have a balanced meal. It also helps you spread carbs throughout the day. ¨ Divide your plate by types of foods. Put non-starchy vegetables on half the plate, meat or other protein food on one-quarter of the plate, and a grain or starchy vegetable in the final quarter of the plate. To this you can add a small piece of fruit and 1 cup of milk or yogurt, depending on how many carbs you are supposed to eat at a meal.  · Try to eat about the same amount of carbs at each meal. Do not \"save up\" your daily allowance of carbs to eat at one meal.  · Proteins have very little or no carbs per serving. Examples of proteins are beef, chicken, turkey, fish, eggs, tofu, cheese, cottage cheese, and peanut butter. A serving size of meat is 3 ounces, which is about the size of a deck of cards. Examples of meat substitute serving sizes (equal to 1 ounce of meat) are 1/4 cup of cottage cheese, 1 egg, 1 tablespoon of peanut butter, and ½ cup of tofu. How can you eat out and still eat healthy? · Learn to estimate the serving sizes of foods that have carbohydrate. If you measure food at home, it will be easier to estimate the amount in a serving of restaurant food. · If the meal you order has too much carbohydrate (such as potatoes, corn, or baked beans), ask to have a low-carbohydrate food instead. Ask for a salad or green vegetables. · If you use insulin, check your blood sugar before and after eating out to help you plan how much to eat in the future. · If you eat more carbohydrate at a meal than you had planned, take a walk or do other exercise. This will help lower your blood sugar. What else should you know? · Limit saturated fat, such as the fat from meat and dairy products. This is a healthy choice because people who have diabetes are at higher risk of heart disease. So choose lean cuts of meat and nonfat or low-fat dairy products.  Use olive or canola oil instead of butter or shortening when cooking. · Don't skip meals. Your blood sugar may drop too low if you skip meals and take insulin or certain medicines for diabetes. · Check with your doctor before you drink alcohol. Alcohol can cause your blood sugar to drop too low. Alcohol can also cause a bad reaction if you take certain diabetes medicines. Follow-up care is a key part of your treatment and safety. Be sure to make and go to all appointments, and call your doctor if you are having problems. It's also a good idea to know your test results and keep a list of the medicines you take. Where can you learn more? Go to http://melissa-carter.info/. Enter X918 in the search box to learn more about \"Learning About Diabetes Food Guidelines. \"  Current as of: March 13, 2017  Content Version: 11.4  © 8035-9087 [employer]. Care instructions adapted under license by [employer] (which disclaims liability or warranty for this information). If you have questions about a medical condition or this instruction, always ask your healthcare professional. [address] any warranty or liability for your use of this information. Hydralazine (By mouth)   Hydralazine Hydrochloride (gah-ITHE-u-zeen cyrus-droe-KLOR-clem)  Treats high blood pressure. Brand Name(s):   There may be other brand names for this medicine. When This Medicine Should Not Be Used: This medicine is not right for everyone. Do not use it if you had an allergic reaction to hydralazine, or if you have coronary artery disease or rheumatic heart disease. How to Use This Medicine:   Tablet  · Take your medicine as directed. Your dose may need to be changed several times to find what works best for you. · Missed dose: Take a dose as soon as you remember. If it is almost time for your next dose, wait until then and take a regular dose. Do not take extra medicine to make up for a missed dose.   · Store the medicine in a closed container at room temperature, away from heat, moisture, and direct light. Drugs and Foods to Avoid:   Ask your doctor or pharmacist before using any other medicine, including over-the-counter medicines, vitamins, and herbal products. · Some foods and medicines can affect how hydralazine works. Tell your doctor if you are using diazoxide or an MAO inhibitor. Warnings While Using This Medicine:   · Tell your doctor if you are pregnant or breastfeeding, or if you have kidney disease, heart or blood vessel disease, heart rhythm problems, lupus, or if you had a heart attack or stroke. · This medicine may cause the following problems:  ¨ Lupus-like syndrome  ¨ Changes in heart rhythm  ¨ Nerve problems  · This medicine may lower your blood pressure too much and cause you to feel dizzy. Do not drive or do anything else that could be dangerous until you know how this medicine affects you. · Your doctor will do lab tests at regular visits to check on the effects of this medicine. Keep all appointments. · Keep all medicine out of the reach of children. Never share your medicine with anyone.   Possible Side Effects While Using This Medicine:   Call your doctor right away if you notice any of these side effects:  · Allergic reaction: Itching or hives, swelling in your face or hands, swelling or tingling in your mouth or throat, chest tightness, trouble breathing  · Change in how much or how often you urinate  · Chest pain that may spread to your arms, jaw, back, or neck, trouble breathing, unusual sweating, faintness  · Fast, pounding, or uneven heartbeat  · Fever, chills, cough, sore throat, and body aches  · Lightheadedness, dizziness, or fainting  · Numbness, tingling, or burning pain in your hands, arms, legs, or feet  · Unusual bleeding, bruising, or weakness  If you notice these less serious side effects, talk with your doctor:   · Diarrhea, nausea, vomiting, loss of appetite  · Headache  · Stuffy nose or watery eyes  If you notice other side effects that you think are caused by this medicine, tell your doctor. Call your doctor for medical advice about side effects. You may report side effects to FDA at 7-574-BBS-2758  © 2017 [address] [employer] Information is for End User's use only and may not be sold, redistributed or otherwise used for commercial purposes. The above information is an  only. It is not intended as medical advice for individual conditions or treatments. Talk to your doctor, nurse or pharmacist before following any medical regimen to see if it is safe and effective for you.

## 2018-05-21 NOTE — TELEPHONE ENCOUNTER
Pharmacist calling in, states there was no dosage on the Vit-D Rx sent in today. Can you please send a new script with the dosage and Dx code on it.     Thank you

## 2018-05-21 NOTE — TELEPHONE ENCOUNTER
Just refilled what was in the record as prescribed by nephrology.  Ask if pt can clarify what is on the bottle he has take in the past.

## 2018-05-21 NOTE — PROGRESS NOTES
HISTORY OF PRESENT ILLNESS  Machelle Fitzpatrick is a 77 y.o. male. HPI Comments: 78 yo male here for f/u of DM, HTN, CKD. HTN: only taking hydralazine 25mg tid but irregular timing (7am, 9am, maybe lunch time). DM ranging 70s-200. When asked about diet he llaughs - \"eats the wrong things\". Sometimes only taking 10 units and not 8units in evening. Notes constipation on iron pills. Stool black. Resolved when he held iron supplement. Review of Systems   Constitutional: Negative for chills, fever and weight loss. HENT: Negative for congestion and ear pain. Eyes: Negative for blurred vision and pain. Respiratory: Negative for cough and shortness of breath. Cardiovascular: Negative for chest pain, palpitations and leg swelling. Gastrointestinal: Positive for constipation. Negative for blood in stool, nausea and vomiting. Genitourinary: Negative for frequency and urgency. Musculoskeletal: Negative for joint pain and myalgias. Skin: Negative for itching and rash. Neurological: Negative for dizziness, tingling and headaches. Psychiatric/Behavioral: Negative for depression. The patient is not nervous/anxious.       Past Medical History:   Diagnosis Date    [facility] [address]     Anemia in chronic kidney disease     Arthritis     ASCVD (arteriosclerotic cardiovascular disease)     Benign hypertensive kidney disease     Chronic kidney disease     STAGE 3    CKD (chronic kidney disease) stage 2, GFR 60-89 ml/min 4/18/2013    CKD (chronic kidney disease), stage III     Diabetes mellitus with chronic kidney disease ([facility] [address]     Dialysis patient ([facility] [address] 09/2014    NOT CURRENTLY ON DIALYSIS    Dyslipidemia     GERD (gastroesophageal reflux disease)     Glaucoma     Heart attack ([facility] [address] 08/2014    Hypertension     Left renal artery stenosis (HCC)     NSTEMI (non-ST elevated myocardial infarction) ([facility] [address]     [facility]     PAF (paroxysmal atrial fibrillation) ([facility] [address] 9/2014    Pulmonary asbestosis ([facility] [address]     Renal mass, right     Stroke [facility])     TIA     Current Outpatient Prescriptions on File Prior to Visit   Medication Sig Dispense Refill    pentoxifylline CR (TRENTAL) 400 mg CR tablet TAKE 1 TABLET BY MOUTH THREE TIMES DAILY 90 Tab 0    omeprazole (PRILOSEC) 20 mg capsule TAKE 1 CAPSULE BY MOUTH EVERY DAY 60 Cap 0    furosemide (LASIX) 40 mg tablet TAKE 1 TABLET BY MOUTH EVERY DAY 60 Tab 0    amLODIPine (NORVASC) 10 mg tablet TAKE 1 TABLET BY MOUTH EVERY DAY 60 Tab 0    CALCIUM 600 + D tablet TAKE 1 TABLET BY MOUTH EVERY DAY 30 Tab 0    pentoxifylline CR (TRENTAL) 400 mg CR tablet TAKE 1 TABLET BY MOUTH THREE TIMES DAILY 90 Tab 0    lisinopril (PRINIVIL, ZESTRIL) 20 mg tablet TAKE 2 TABLETS BY MOUTH EVERY DAY 60 Tab 5    aspirin delayed-release 81 mg tablet TAKE 1 TABLET BY MOUTH EVERY DAY 60 Tab 0    AURYXIA 210 mg iron tablet Take  by mouth three (3) times daily. 5    hydrALAZINE (APRESOLINE) 25 mg tablet Take 2 Tabs by mouth three (3) times daily. Indications: hypertension 270 Tab 3    HUMALOG MIX 75-25 KWIKPEN 100 unit/mL (75-25) inpn INJECT 10 UNITS UNDER THE SKIN EVERY MORNING AND 8 UNITS EVERY EVENING 45 mL 0    folic acid (FOLVITE) 1 mg tablet TAKE 1 TABLET BY MOUTH EVERY DAY 90 Tab 3    OCTAVIO PEN NEEDLE 32 gauge x 5/32\" ndle USE TWICE DAILY AS DIRECTED 200 Pen Needle 3    atorvastatin (LIPITOR) 80 mg tablet TAKE 1 TABLET BY MOUTH EVERY NIGHT AT BEDTIME 30 Tab 11    Insulin Lisp & Lisp Prot, Hum, (HUMALOG MIX 75-25 KWIKPEN) 100 unit/mL (75-25) inpn 10 units am and 8 units pm 3 Pen 3    aspirin delayed-release 81 mg tablet TAKE 1 TABLET BY MOUTH EVERY DAY 60 Tab 0    Calcium-Cholecalciferol, D3, (CALCIUM 600 WITH VITAMIN D3) 600 mg(1,500mg) -400 unit cap Take 1 Cap by mouth daily.       isosorbide mononitrate ER (IMDUR) 30 mg tablet 1qd 60 Tab 5    calcitRIOL (ROCALTROL) 0.25 mcg capsule TK 1 C PO D  9     No current facility-administered medications on file prior to visit. Physical Exam   Constitutional: He appears well-developed and well-nourished. No distress. /68 (BP 1 Location: Right arm, BP Patient Position: Sitting)  Pulse 65  Temp 98.5 °F (36.9 °C) (Oral)   Resp 18  Ht 5' 7\" (1.702 m)  Wt 142 lb 9.6 oz (64.7 kg)  SpO2 99%  BMI 22.33 kg/m2     Eyes: EOM are normal. Right eye exhibits no discharge. Left eye exhibits no discharge. No scleral icterus. Neck: Neck supple. Cardiovascular: Normal rate, regular rhythm and normal heart sounds. Exam reveals no gallop and no friction rub. No murmur heard. Pulmonary/Chest: Effort normal and breath sounds normal. No respiratory distress. He has no wheezes. He has no rales. Musculoskeletal: He exhibits no edema or tenderness. Lymphadenopathy:     He has no cervical adenopathy. Neurological: He is alert. He exhibits normal muscle tone. Skin: Skin is warm and dry. Psychiatric: He has a normal mood and affect. Lab Results   Component Value Date/Time    Sodium 140 03/05/2018 01:00 PM    Potassium 4.3 03/05/2018 01:00 PM    Chloride 107 03/05/2018 01:00 PM    CO2 25 03/05/2018 01:00 PM    Glucose 125 (H) 03/05/2018 01:00 PM    BUN 71 (H) 03/05/2018 01:00 PM    Creatinine 4.6 (H) 03/05/2018 01:00 PM    BUN/Creatinine ratio 18 03/04/2015 02:14 PM    GFR est AA 16.0 03/05/2018 01:00 PM    GFR est non-AA 13 03/05/2018 01:00 PM    Calcium 8.9 03/05/2018 01:00 PM    Bilirubin, total 0.6 08/21/2014 02:41 PM    AST (SGOT) 29 08/21/2014 02:41 PM    Alk.  phosphatase 85 08/21/2014 02:41 PM    Protein, total 6.5 08/21/2014 02:41 PM    Albumin 3.3 (L) 03/05/2018 01:00 PM    A-G Ratio 1.6 08/21/2014 02:41 PM    ALT (SGPT) 36 08/21/2014 02:41 PM     Lab Results   Component Value Date/Time    WBC 3.4 (L) 03/05/2018 01:00 PM    HGB 9.3 (L) 03/05/2018 01:00 PM    HCT 29.6 (L) 03/05/2018 01:00 PM    PLATELET 801 00/78/8133 01:00 PM    MCV 86.8 03/05/2018 01:00 PM Lab Results   Component Value Date/Time    Hemoglobin A1c 7.4 (H) 07/21/2017 02:13 PM    Hemoglobin A1c (POC) 7.5 04/20/2017 08:15 AM     ASSESSMENT and PLAN    ICD-10-CM ICD-9-CM    1. Essential hypertension I10 401.9    2. 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 CKD (chronic kidney disease) stage 4, GFR 15-29 ml/min ([facility]) N18.4 585.4    4. Drug-induced constipation K59.03 564.09      E980.5      Asked that he take hydralazine 50 mg TID as previously prescribed. Discussed spacing this out rather than AM dosing. Repeat A1c today shows good control at 6.1%. Discussed dietary changes. Discussed importance of controlling DM, HTN for his CKD. F/u with nephrology as planned. Will add colace for constipation related to his iron.

## 2018-05-24 NOTE — TELEPHONE ENCOUNTER
Contacted pt at Pending sale to [facility] number. Two patient Identifiers confirmed. Advised pt per Dr Ivette España notes. Pt stated is taking VIT D once daily. Pt stated he did  medication already. No other issue noted.

## 2018-05-26 PROBLEM — N28.9 RENAL INSUFFICIENCY: Status: ACTIVE | Noted: 2018-01-01

## 2018-05-26 PROBLEM — R07.9 CHEST PAIN: Status: ACTIVE | Noted: 2018-01-01

## 2018-05-30 NOTE — PROGRESS NOTES
Hospital Discharge Follow-Up      Date/Time:  5/30/2018 12:38 PM    Patient was admitted to [facility] on 5/26/18 and discharged on 5/29/18 for NonST MI. The physician discharge summary was available at the time of outreach. Patient was contacted within 2 business days of discharge. Top Challenges reviewed with the provider   Medication Compliance         Method of communication with provider :face to face, chart routing    Inpatient RRAT score: High Risk 24    Was this a readmission? no   Patient stated reason for the readmission: n/a    Nurse Navigator (NN) met with the patient while here for his appointment with Dr. Dudley Gil. Provided introduction to self, and explanation of the Nurse Navigator role. Reviewed discharge instructions and red flags with patient who verbalized understanding. Patient given an opportunity to ask questions and does not have any further questions or concerns at this time. The patient agrees to contact the PCP office for questions related to their healthcare. NN provided contact information for future reference. Summary of patient's top problems:  1. Medication non compliance  2. Kidney Disease - may need dialysis at some point in future. 3. Hypertension    Home Health orders at discharge: None  [address]: n/a  Date of initial visit: n/a    Durable Medical Equipment ordered/company: none  Durable Medical Equipment received: n/a    Barriers to care? level of motivation, medication management    Advance Care Planning:   Does patient have an Advance Directive:  not on file - given booklet and form - they will take home and return it at next appt      Medication:     New Medications at Discharge: Coreg, Plavix  Changed Medications at Discharge: none  Discontinued Medications at Discharge: none    Medication reconciliation was performed with patient, who verbalizes understanding of administration of home medications.   There were no barriers to obtaining medications identified at this time. Referral to Pharm D needed: yes  - Pt to meet w/ Pharm D next week     Current Outpatient Prescriptions   Medication Sig    Insulin Needles, Disposable, (OCTAVIO PEN NEEDLE) 32 gauge x 5/32\" ndle USE TWICE DAILY AS DIRECTED    carvedilol (COREG) 25 mg tablet Take 1 Tab by mouth two (2) times daily (with meals). Indications: hypertension    clopidogrel (PLAVIX) 75 mg tab Take 1 Tab by mouth daily. Indications: Acute Coronary Syndrome    ferrous sulfate 325 mg (65 mg iron) tablet Take  by mouth Daily (before breakfast).  glucose blood VI test strips (FREESTYLE PRECISION MESHA STRIPS) strip Use to check BS three times daily. Test before Breakfast, lunch and dinner.  isosorbide mononitrate ER (IMDUR) 30 mg tablet 30 mg.    calcium-cholecalciferol, D3, (CALCIUM 600 + D) tablet TAKE 1 TABLET BY MOUTH EVERY DAY    aspirin delayed-release 81 mg tablet TAKE 1 TABLET BY MOUTH EVERY DAY    docusate sodium (COLACE) 50 mg capsule Take 1 Cap by mouth two (2) times daily as needed for Constipation.  omeprazole (PRILOSEC) 20 mg capsule TAKE 1 CAPSULE BY MOUTH EVERY DAY    furosemide (LASIX) 40 mg tablet TAKE 1 TABLET BY MOUTH EVERY DAY    amLODIPine (NORVASC) 10 mg tablet TAKE 1 TABLET BY MOUTH EVERY DAY    lisinopril (PRINIVIL, ZESTRIL) 20 mg tablet TAKE 2 TABLETS BY MOUTH EVERY DAY    AURYXIA 210 mg iron tablet Take  by mouth three (3) times daily.  hydrALAZINE (APRESOLINE) 25 mg tablet Take 2 Tabs by mouth three (3) times daily. Indications: hypertension    folic acid (FOLVITE) 1 mg tablet TAKE 1 TABLET BY MOUTH EVERY DAY    atorvastatin (LIPITOR) 80 mg tablet TAKE 1 TABLET BY MOUTH EVERY NIGHT AT BEDTIME    Insulin Lisp & Lisp Prot, Hum, (HUMALOG MIX 75-25 KWIKPEN) 100 unit/mL (75-25) inpn 10 units am and 8 units pm     No current facility-administered medications for this visit.         Medications Discontinued During This Encounter   Medication Reason    HUMALOG MIX 75-25 KWIKPEN 100 unit/mL (06-83) inpn Duplicate Order       PCP/Specialist follow up: Future Appointments  Date Time Provider Luisa Mills   6/5/2018 8:15 AM MD DON Merida [address]   6/5/2018 9:00 AM ARTUR Colon SCHED          Goals      Knowledge and adherence of medication (ie. antiplatelet, B blocker, ACE, lipid lowering agent, action, side effects, missed dose, etc.)            5/30/18 Pt admits to non compliance with \"some of his medications\"     -given daily pill box and has appt next week w/ pharm D to review meds and see if we can simplify regime and improve compliance       Prepare patients and caregivers for end of life decisions (ie. need for hospice, pain management, symptom relief, advance directives etc.)            Given booklet and blank ACP form for pt and wife to discuss and fill out.  Understands red flags post discharge.

## 2018-05-30 NOTE — TELEPHONE ENCOUNTER
2 pt. Identifiers confirmed. S/W pt's wife who stated she was concerned pt. may need a home health aid. She was advised that there is a possibility that they may not pay for an aid c medicare unless he has a current skilled need. They may offer bath visits, but this does not include VS checks as she inquired about. She states she was just concerned bc his BP was up and his HR went janee. Per pt. The HR was around 53 but he felt fine. Anitha Zamora, Nurse navigator, s/w pt. And his wife and advised them that his carvedilol will drop his HR, but as long as he is feeling fine and above 50-55, its should be okay. No other questions/concerns at this this time.

## 2018-05-30 NOTE — PROGRESS NOTES
HISTORY OF PRESENT ILLNESS  Zack Armendariz is a 77 y.o. male. HPI Comments: 76 yo male here for f/u from hospital stay 5/26-29 at [facility] due to CP (discharge yesterday). Troponin elevated but not felt to be candidate for LHC. Stress test without reversible ischemia. Plavix added to his medication, Coreg increased to 25mg BID due to his poorly controlled HTN. He states he was not given this medication at discharge so did not take today. Renal function remains sig impaired. He is not ready for HD. Hospital Follow Up   Associated symptoms include chest pain (resolved). Pertinent negatives include no headaches and no shortness of breath. Review of Systems   Constitutional: Negative for chills, fever and weight loss. HENT: Negative for congestion and ear pain. Eyes: Negative for blurred vision and pain. Respiratory: Negative for cough and shortness of breath. Cardiovascular: Positive for chest pain (resolved). Negative for palpitations and leg swelling. Gastrointestinal: Negative for nausea and vomiting. Genitourinary: Negative for frequency and urgency. Musculoskeletal: Negative for joint pain and myalgias. Skin: Negative for itching and rash. Neurological: Negative for dizziness, tingling and headaches. Psychiatric/Behavioral: Negative for depression. The patient is not nervous/anxious.       Past Medical History:   Diagnosis Date    NA[facility]ARIO [facility]acute kidney [facility]) ([facility]     Anemia in chronic kidney disease     Arthritis     ASCVD (arteriosclerotic cardiovascular disease)     Benign hypertensive kidney disease     Chronic kidney disease     STAGE 3    CKD (chronic kidney disease) stage 2, GFR 60-89 ml/min 4/18/2013    CKD (chronic kidney disease), stage III     Diabetes mellitus with chronic kidney disease ([facility]     Dialysis patient ([facility] 09/2014    NOT CURRENTLY ON DIALYSIS    Dyslipidemia     GERD (gastroesophageal reflux disease)     Glaucoma     Heart attack ([address] 08/2014    Hypertension     Left renal artery stenosis (HCC)     NSTEMI (non-ST elevated myocardial infarction) ([facility]     PAD (peripheral artery disease) (HCC)     [facility]ox[facility] fibrillation[facility] 9/2014    Pulmonary asbestosis ([facility]     Renal mass, right     Stroke [facility])     TIA     Current Outpatient Prescriptions on File Prior to Visit   Medication Sig Dispense Refill    carvedilol (COREG) 12.5 mg tablet Take 2 Tabs by mouth two (2) times daily (with meals). 60 Tab 2    clopidogrel (PLAVIX) 75 mg tab Take 1 Tab by mouth daily. Indications: Acute Coronary Syndrome 30 Tab 2    glucose blood VI test strips (FREESTYLE PRECISION MESHA STRIPS) strip Use to check BS three times daily. Test before Breakfast, lunch and dinner. 100 Strip 3    isosorbide mononitrate ER (IMDUR) 30 mg tablet 30 mg.      calcium-cholecalciferol, D3, (CALCIUM 600 + D) tablet TAKE 1 TABLET BY MOUTH EVERY DAY 30 Tab 2    aspirin delayed-release 81 mg tablet TAKE 1 TABLET BY MOUTH EVERY DAY 60 Tab 0    docusate sodium (COLACE) 50 mg capsule Take 1 Cap by mouth two (2) times daily as needed for Constipation. 60 Cap 5    omeprazole (PRILOSEC) 20 mg capsule TAKE 1 CAPSULE BY MOUTH EVERY DAY 60 Cap 0    furosemide (LASIX) 40 mg tablet TAKE 1 TABLET BY MOUTH EVERY DAY 60 Tab 0    amLODIPine (NORVASC) 10 mg tablet TAKE 1 TABLET BY MOUTH EVERY DAY 60 Tab 0    pentoxifylline CR (TRENTAL) 400 mg CR tablet TAKE 1 TABLET BY MOUTH THREE TIMES DAILY 90 Tab 0    lisinopril (PRINIVIL, ZESTRIL) 20 mg tablet TAKE 2 TABLETS BY MOUTH EVERY DAY 60 Tab 5    AURYXIA 210 mg iron tablet Take  by mouth three (3) times daily. 5    hydrALAZINE (APRESOLINE) 25 mg tablet Take 2 Tabs by mouth three (3) times daily.  Indications: hypertension 270 Tab 3    HUMALOG MIX 75-25 KWIKPEN 100 unit/mL (75-25) inpn INJECT 10 UNITS UNDER THE SKIN EVERY MORNING AND 8 UNITS EVERY EVENING 45 mL 0    folic acid (FOLVITE) 1 mg tablet TAKE 1 TABLET BY MOUTH EVERY DAY 90 Tab 3    OCTAVIO PEN NEEDLE 32 gauge x 5/32\" ndle USE TWICE DAILY AS DIRECTED 200 Pen Needle 3    atorvastatin (LIPITOR) 80 mg tablet TAKE 1 TABLET BY MOUTH EVERY NIGHT AT BEDTIME 30 Tab 11    ferrous sulfate 325 mg (65 mg iron) tablet Take  by mouth Daily (before breakfast).       pentoxifylline CR (TRENTAL) 400 mg CR tablet TAKE 1 TABLET BY MOUTH THREE TIMES DAILY 90 Tab 0    Insulin Lisp & Lisp Prot, Hum, (HUMALOG MIX 75-25 KWIKPEN) 100 unit/mL (75-25) inpn 10 units am and 8 units pm 3 Pen 3     Current Facility-Administered Medications on File Prior to Visit   Medication Dose Route Frequency Provider Last Rate Last Dose    [COMPLETED] technetium sestamibi (CARDIOLITE) injection 30 millicurie  30 millicurie IntraVENous RAD ONCE Joanne Ruvalcaba MD   31 millicurie at 67/75/60 0179    [COMPLETED] regadenoson (LEXISCAN) injection 0.4 mg  0.4 mg IntraVENous RAD ONCE Carolyn Delaney NP   0.4 mg at 05/29/18 1445    [COMPLETED] sodium chloride (NS) flush 5-10 mL  5-10 mL IntraVENous RAD ONCE Carolyn Delaney NP   10 mL at 05/29/18 1445    [DISCONTINUED] aspirin delayed-release tablet 81 mg  81 mg Oral DAILY Cathyann Loss Sposato, DO   81 mg at 05/29/18 0931    [DISCONTINUED] clopidogrel (PLAVIX) tablet 75 mg  75 mg Oral DAILY Cathyann Loss Sposato, DO   75 mg at 05/29/18 0931    [DISCONTINUED] amLODIPine (NORVASC) tablet 10 mg  10 mg Oral DAILY Joanne Ruvalcaba MD   10 mg at 05/29/18 0930    [DISCONTINUED] atorvastatin (LIPITOR) tablet 80 mg  80 mg Oral QHS Joanne Ruvalcaba MD   80 mg at 05/28/18 2329    [DISCONTINUED] folic acid (FOLVITE) tablet 1 mg  1 mg Oral DAILY Joanne Ruvalcaba MD   1 mg at 05/29/18 0931    [DISCONTINUED] furosemide (LASIX) tablet 40 mg  40 mg Oral DAILY Joanne Ruvalcaba MD   40 mg at 05/29/18 1711    [DISCONTINUED] hydrALAZINE (APRESOLINE) tablet 50 mg  50 mg Oral TID Joanne Ruvalcaba MD   50 mg at 05/29/18 1717    [DISCONTINUED] isosorbide mononitrate ER (IMDUR) tablet 30 mg  30 mg Oral Marianne Anthony MD   30 mg at 05/29/18 0930    [DISCONTINUED] omeprazole (PRILOSEC) capsule 20 mg  20 mg Oral DAILY Stephanie Campos MD   20 mg at 05/29/18 0931    [DISCONTINUED] naloxone [facility]) injection 0.1 mg  0.1 mg IntraVENous PRN Stephanie Campos MD        [DISCONTINUED] acetaminophen (TYLENOL) tablet 650 mg  650 mg Oral Q4H PRN Stephanie Campos MD        [DISCONTINUED] acetaminophen (TYLENOL) solution 650 mg  650 mg Oral Q4H PRN Stephanie Campos MD        [DISCONTINUED] acetaminophen (TYLENOL) suppository 650 mg  650 mg Rectal Q4H PRN Stephanie Campos MD        [DISCONTINUED] HYDROcodone-acetaminophen [facility]) 5-325 mg per tablet 1 Tab  1 Tab Oral Q4H PRN Stephanie Campos MD   1 Tab at 05/29/18 1717    [DISCONTINUED] morphine injection 2 mg  2 mg IntraVENous Q4H PRN Stephanie Campos MD       [address] [place] [address] [DISCONTINUED] diphenhydrAMINE (BENADRYL) injection 25 mg  25 mg IntraVENous Q4H PRN Stephanie Campos MD        [DISCONTINUED] ondansetron [facility]) injection 4 mg  4 mg IntraVENous Q4H PRN Stephanie Campos MD        [DISCONTINUED] albuterol (PROVENTIL VENTOLIN) nebulizer solution 2.5 mg  2.5 mg Nebulization Q6H PRN Stephanie Campos MD        [DISCONTINUED] polyethylene glycol (MIRALAX) packet 17 g  17 g Oral DAILY PRN Stephanie Campos MD        [DISCONTINUED] heparin (porcine) 1,000 unit/mL injection 5,120 Units  80 Units/kg IntraVENous PRN Stephanie Campos MD   5,120 Units at 05/27/18 1551    [DISCONTINUED] heparin (porcine) 1,000 unit/mL injection 2,560 Units  40 Units/kg IntraVENous PRN Stephanie Campos MD        [DISCONTINUED] heparin (porcine) 25,000 units in 0.45% saline 500 ml infusion  0-36 Units/kg/hr IntraVENous TITRATE Stephanie Campos MD 12.8 mL/hr at 05/29/18 1328 10 Units/kg/hr at 05/29/18 1328    [DISCONTINUED] hydrALAZINE (APRESOLINE) 20 mg/mL injection 10 mg  10 mg IntraVENous Q6H PRN Stephanie Campos MD   10 mg at 05/27/18 05    [DISCONTINUED] carvedilol (COREG) tablet 12.5 mg 12.5 mg Oral BID WITH MEALS Joanne Ruvalcaba MD   12.5 mg at 05/29/18 1717    [DISCONTINUED] dextrose (D50W) injection syrg 5-25 g  10-50 mL IntraVENous PRN Margareth Aguirre MD   25 g at 05/29/18 1257    [DISCONTINUED] glucagon (GLUCAGEN) injection 1 mg  1 mg IntraMUSCular PRN Margareth Aguirre MD        [DISCONTINUED] insulin glargine (LANTUS) injection 1-100 Units  1-100 Units SubCUTAneous QHS Margareth Aguirre MD   7 Units at 05/28/18 2247    [DISCONTINUED] insulin lispro (HUMALOG) injection   SubCUTAneous AC&HS Margareth Aguirre MD   3 Units at 05/29/18 1804    [DISCONTINUED] insulin lispro (HUMALOG) injection 1-100 Units  1-100 Units SubCUTAneous PRN Margareth Aguirre MD   1 Units at 05/28/18 2245     Physical Exam   Constitutional: He appears well-developed and well-nourished. No distress. /68 (BP 1 Location: Right arm, BP Patient Position: Sitting)  Pulse 63  Temp 97.3 °F (36.3 °C) (Oral)   Resp 18  Ht 5' 7\" (1.702 m)  Wt 143 lb 6.4 oz (65 kg)  SpO2 100%  BMI 22.46 kg/m2     Eyes: EOM are normal. Right eye exhibits no discharge. Left eye exhibits no discharge. No scleral icterus. Neck: Neck supple. Cardiovascular: Normal rate, regular rhythm and normal heart sounds. Exam reveals no gallop and no friction rub. No murmur heard. Pulmonary/Chest: Effort normal and breath sounds normal. No respiratory distress. He has no wheezes. He has no rales. Musculoskeletal: He exhibits no edema or tenderness. Lymphadenopathy:     He has no cervical adenopathy. Neurological: He is alert. He exhibits normal muscle tone. Skin: Skin is warm and dry. Psychiatric: He has a normal mood and affect.      Lab Results   Component Value Date/Time    Sodium 137 05/29/2018 04:12 AM    Potassium 4.1 05/29/2018 04:12 AM    Chloride 106 05/29/2018 04:12 AM    CO2 21 05/29/2018 04:12 AM    Anion gap 10 05/29/2018 04:12 AM    Glucose 62 (L) 05/29/2018 04:12 AM    BUN 87 (H) 05/29/2018 04:12 AM Creatinine 5.4 (H) 05/29/2018 04:12 AM    BUN/Creatinine ratio 18 03/04/2015 02:14 PM    GFR est AA 13.0 05/29/2018 04:12 AM    GFR est non-AA 11 05/29/2018 04:12 AM    Calcium 7.8 (L) 05/29/2018 04:12 AM    Bilirubin, total 0.6 08/21/2014 02:41 PM    AST (SGOT) 29 08/21/2014 02:41 PM    Alk. phosphatase 85 08/21/2014 02:41 PM    Protein, total 6.5 08/21/2014 02:41 PM    Albumin 2.4 (L) 05/29/2018 04:12 AM    A-G Ratio 1.6 08/21/2014 02:41 PM    ALT (SGPT) 36 08/21/2014 02:41 PM     Lab Results   Component Value Date/Time    WBC 4.9 05/27/2018 07:08 AM    HGB 9.1 (L) 05/27/2018 07:08 AM    HCT 28.1 (L) 05/27/2018 07:08 AM    PLATELET 078 73/74/4402 07:08 AM    MCV 85.4 05/27/2018 07:08 AM     Lab Results   Component Value Date/Time    Hemoglobin A1c 6.2 05/26/2018 01:01 PM    Hemoglobin A1c (POC) 7.5 04/20/2017 08:15 AM     ASSESSMENT and PLAN    ICD-10-CM ICD-9-CM    1. Chest pain, unspecified type R07.9 786.50    2. Essential hypertension I10 401.9      Reordered Coreg at 25mg dose to simplify given polypharmacy. Will hold trental for now since started on Plavix. Asked that he schedule f/u in one week to reassess BP and appt with PharmD to assist with medication reconciliation. Discussed renal diet. Will consider referral to dietitian. Patient seen by NN during appt time to discuss his recent hospitalization.

## 2018-05-30 NOTE — MR AVS SNAPSHOT
[facility] 
 
 
 [address] Located within [facility] [address] 
[phone] Patient: Shane Borrego MRN: [id number]:[id number] Visit Information Date & Time Provider Department Dept. Phone Encounter #  
 5[facility]30/2018 11:00 [phone] [id number] Follow-up Instructions Return in about 1 week (around 6/6/2018), or if symptoms worsen or fail to improve, for hypertension, medication review. Upcoming Health Maintenance Date Due Pneumococcal 65+ Low/Medium Risk (2 of 2 - PPSV23) 11/11/2010 MICROALBUMIN Q1 12/6/2017 FOOT EXAM Q1 4/20/2018 EYE EXAM RETINAL OR DILATED Q1 5/18/2018 MEDICARE YEARLY EXAM 6/3/2018 Influenza Age 5 to Adult 8/1/2018 HEMOGLOBIN A1C Q6M 11/26/2018 GLAUCOMA SCREENING Q2Y 5/18/2019 LIPID PANEL Q1 5/26/2019 COLONOSCOPY 7/8/2021 DTaP/Tdap/Td series (2 - Td) 6/2/2027 Allergies as of 5/30/2018  Review Complete On: 5/30/2018 By: Clifton Ramon Severity Noted Reaction Type Reactions Iodinated Contrast- Oral And Iv Dye  06/07/2017    Other (comments) PT HAS RENAL DISEASE AND IS UNABLE TO TOLERATE Lisinopril  11/20/2014    Other (comments) PT IS CURRENTLY TAKING THIS MEDICATION. IS NOT ALLERGIC Tramadol  06/07/2017   Systemic Other (comments) BRADYCARDIA Current Immunizations  Reviewed on 11/15/2013 Name Date Influenza High Dose Vaccine PF 11/3/2017, 10/1/2015 Influenza Vaccine Split 11/1/2012 Pneumococcal Vaccine (Unspecified Type) 11/11/2005 Not reviewed this visit You Were Diagnosed With   
  
 Codes Comments Chest pain, unspecified type    -  Primary ICD-10-CM: R07.9 ICD-9-CM: 786.50 Essential hypertension     ICD-10-CM: I10 
ICD-9-CM: 401.9 Vitals BP Pulse Temp Resp Height(growth percentile) Weight(growth percentile) 197/68 (BP 1 Location: Right arm, BP Patient Position: Sitting) 63 97.3 °F (36.3 °C) (Oral) 18 5' 7\" (1.702 m) 143 lb 6.4 oz (65 kg) SpO2 BMI Smoking Status 100% 22.46 kg/m2 Former Smoker Vitals History BMI and BSA Data Body Mass Index Body Surface Area  
 22.46 kg/m 2 1.75 m 2 Preferred Pharmacy Pharmacy Name Phone [facility], [address] [place] [address] [address] [facility] [phone] Your Updated Medication List  
  
   
This list is accurate as of 5/30/18 11:59 AM.  Always use your most recent med list. amLODIPine 10 mg tablet Commonly known as:  Sterling Rings TAKE 1 TABLET BY MOUTH EVERY DAY  
  
 aspirin delayed-release 81 mg tablet TAKE 1 TABLET BY MOUTH EVERY DAY  
  
 atorvastatin 80 mg tablet Commonly known as:  LIPITOR  
TAKE 1 TABLET BY MOUTH EVERY NIGHT AT BEDTIME  
  
 AURYXIA 210 mg iron tablet Generic drug:  ferric citrate Take  by mouth three (3) times daily. calcium-cholecalciferol (D3) tablet Commonly known as:  Calcium 600 + D  
TAKE 1 TABLET BY MOUTH EVERY DAY  
  
 carvedilol 25 mg tablet Commonly known as:  Carrolyn Peabody Take 1 Tab by mouth two (2) times daily (with meals). Indications: hypertension  
  
 clopidogrel 75 mg Tab Commonly known as:  PLAVIX Take 1 Tab by mouth daily. Indications: Acute Coronary Syndrome  
  
 docusate sodium 50 mg capsule Commonly known as:  Gordon Pian Take 1 Cap by mouth two (2) times daily as needed for Constipation. ferrous sulfate 325 mg (65 mg iron) tablet Take  by mouth Daily (before breakfast). folic acid 1 mg tablet Commonly known as:  FOLVITE  
TAKE 1 TABLET BY MOUTH EVERY DAY  
  
 furosemide 40 mg tablet Commonly known as:  LASIX TAKE 1 TABLET BY MOUTH EVERY DAY  
  
 glucose blood VI test strips strip Commonly known as:  FREESTYLE PRECISION MESHA STRIPS  
 Use to check BS three times daily. Test before Breakfast, lunch and dinner. hydrALAZINE 25 mg tablet Commonly known as:  APRESOLINE Take 2 Tabs by mouth three (3) times daily. Indications: hypertension * insulin lispro  & lisp protamine (human) 100 unit/mL (75-25) Inpn Commonly known as:  HumaLOG Mix 75-25 KwikPen 10 units am and 8 units pm  
  
 * HumaLOG Mix 75-25 KwikPen 100 unit/mL (75-25) Inpn Generic drug:  insulin lispro  & lisp protamine (human) INJECT 10 UNITS UNDER THE SKIN EVERY MORNING AND 8 UNITS EVERY EVENING Insulin Needles (Disposable) 32 gauge x 5/32\" Ndle Commonly known as:  Octavio Pen Needle USE TWICE DAILY AS DIRECTED  
  
 isosorbide mononitrate ER 30 mg tablet Commonly known as:  IMDUR  
30 mg.  
  
 lisinopril 20 mg tablet Commonly known as:  PRINIVIL, ZESTRIL  
TAKE 2 TABLETS BY MOUTH EVERY DAY  
  
 omeprazole 20 mg capsule Commonly known as:  PRILOSEC  
TAKE 1 CAPSULE BY MOUTH EVERY DAY  
  
 * Notice: This list has 2 medication(s) that are the same as other medications prescribed for you. Read the directions carefully, and ask your doctor or other care provider to review them with you. Prescriptions Sent to Pharmacy Refills Insulin Needles, Disposable, (OCTAVIO PEN NEEDLE) 32 gauge x 5/32\" ndle 3 Sig: USE TWICE DAILY AS DIRECTED Class: Normal  
 Pharmacy: [facility] [address] Ph #: [phone]  
 carvedilol (COREG) 25 mg tablet 5 Sig: Take 1 Tab by mouth two (2) times daily (with meals). Indications: hypertension Class: Normal  
 Pharmacy: [facility] [address] [facility], [address] Ph #: [phone] Route: Oral  
  
Follow-up Instructions Return in about 1 week (around 6/6/2018), or if symptoms worsen or fail to improve, for hypertension, medication review. Introducing [facility]! [employer] introduces [employer] patient portal. Now you can access parts of your medical record, email your doctor's office, and request medication refills online. 1. In your internet browser, go to [email or web address] 2. Click on the First Time User? Click Here link in the Sign In box. You will see the New Member Sign Up page. 3. Enter your [employer] Access Code exactly as it appears below. You will not need to use this code after youve completed the sign-up process. If you do not sign up before the expiration date, you must request a new code. · [employer] Access Code: [email or web address] Expires: 8/19/2018 10:02 AM 
 
4. Enter the last four digits of your Social Security Number (xxxx) and Date of Birth (mm/dd/yyyy) as indicated and click Submit. You will be taken to the next sign-up page. 5. Create a [employer] ID. This will be your [employer] login ID and cannot be changed, so think of one that is secure and easy to remember. 6. Create a [employer] password. You can change your password at any time. 7. Enter your Password Reset Question and Answer. This can be used at a later time if you forget your password. 8. Enter your e-mail address. You will receive e-mail notification when new information is available in [facility] 9. Click Sign Up. You can now view and download portions of your medical record. 10. Click the Download Summary menu link to download a portable copy of your medical information. If you have questions, please visit the Frequently Asked Questions section of the [employer] website. Remember, [employer] is NOT to be used for urgent needs. For medical emergencies, dial 911. Now available from your iPhone and Android! Please provide this summary of care documentation to your next provider. Your primary care clinician is listed as [address] If you have any questions after today's visit, please call [phone].

## 2018-05-30 NOTE — PROGRESS NOTES
ROOM # [id number]  Identified pt with two pt identifiers(name and ). Reviewed record in preparation for visit and have obtained necessary documentation. Chief Complaint   Patient presents with   [facility] Follow Up     heart attack      Adrien Lopez preferred language for health care discussion is english/other. Is the patient using any DME equipment during OV? NO    [place] Class is due for:  Health Maintenance Due   Topic    Pneumococcal 65+ Low/Medium Risk (2 of 2 - PPSV23)    MICROALBUMIN Q1     FOOT EXAM Q1     EYE EXAM RETINAL OR DILATED Q1      Health Maintenance reviewed and discussed per provider  Please order/place referral if appropriate. Advance Directive:  1. Do you have an advance directive in place? Patient Reply: NO    2. If not, would you like material regarding how to put one in place? YES    Coordination of Care:  1. Have you been to the ER, urgent care clinic since your last visit? Hospitalized since your last visit? YES    2. Have you seen or consulted any other health care providers outside of the [address] [place] [address] since your last visit? Include any pap smears or colon screening. NO    Patient is accompanied by spouse I have received verbal consent from Adrien John to discuss any/all medical information while they are present in the room.     Learning Assessment:  Learning Assessment 2015   PRIMARY LEARNER Patient Patient   HIGHEST LEVEL OF EDUCATION - PRIMARY LEARNER  GRADUATED HIGH SCHOOL OR GED GRADUATED HIGH SCHOOL OR GED   PRIMARY LANGUAGE ENGLISH ENGLISH    NEED - No   LEARNER PREFERENCE PRIMARY DEMONSTRATION VIDEOS   LEARNING SPECIAL TOPICS - no   ANSWERED BY patient patient   RELATIONSHIP SELF SELF     Depression Screening:  PHQ over the last two weeks 2018 2018 2/15/2018 2017 2017 2017 2017   Little interest or pleasure in doing things Not at all Not at all Not at all Not at all Not at all Nearly every day Not at all Feeling down, depressed or hopeless Not at all Not at all Not at all Not at all Not at all Several days Not at all   Total Score PHQ 2 0 0 0 0 0 4 0     Abuse Screening:  Abuse Screening Questionnaire 6/2/2017 5/11/2016 6/8/2015 3/4/2015   Do you ever feel afraid of your partner? N N N N   Are you in a relationship with someone who physically or mentally threatens you? N N N N   Is it safe for you to go home? Georgina Storey     Fall Risk  Fall Risk Assessment, last 12 mths 5/30/2018 5/21/2018 2/15/2018 6/13/2017 6/2/2017 5/2/2017 12/8/2016   Able to walk? Yes Yes Yes Yes Yes Yes Yes   Fall in past 12 months?  No No No No No No No

## 2018-06-05 PROBLEM — N18.5 CKD (CHRONIC KIDNEY DISEASE) STAGE 5, GFR LESS THAN 15 ML/MIN (HCC): Status: ACTIVE | Noted: 2018-01-01

## 2018-06-05 NOTE — PROGRESS NOTES
Pharmacy Note - Medication Review   06/05/18         Subjective / Objective      Jenifer Crawford is a 77 y.o. male who was seen today for a medication review. Patient has already seen PCP today for BP f/u. YOB: 1940    Referred by: Dr. Edward Giron for medication reconciliation/education.      Past Medical History:   Diagnosis Date    NAZARIO (acute kidney injury) ([facility]     Anemia in chronic kidney disease     Arthritis     ASCVD (arteriosclerotic cardiovascular disease)     Benign hypertensive kidney disease     Chronic kidney disease     STAGE 3    CKD (chronic kidney disease) stage 2, GFR 60-89 ml/min 4/18/2013    CKD (chronic kidney disease), stage III     Diabetes mellitus with chronic kidney disease ([facility]     Dialysis patient ([facility] 09/2014    NOT CURRENTLY ON DIALYSIS    Dyslipidemia     GERD (gastroesophageal reflux disease)     Glaucoma     Heart attack ([address] 08/2014    Hypertension     Left renal artery stenosis (HCC)     NSTEMI (non-ST elevated myocardial infarction) (HCC)     [facility]ipheral [facility]     PAF (paroxysmal atrial fibrillation) ([address] 9/2014    Pulmonary asbestosis ([facility]     Renal mass, right     Stroke ([facility]     TIA       Past Surgical History:   Procedure Laterality Date    CARDIAC SURG PROCEDURE UNLIST Left 9/2014    VASCULAR STENTS IN LEG    HX COLONOSCOPY      HX CORONARY ARTERY BYPASS GRAFT  09/2014    HX ENDOSCOPY      HX HEENT      GLAUCOMA SURGERY    HX HEENT Bilateral     CATARACTS       Family History   Problem Relation Age of Onset    Diabetes Mother     Cancer Father     Hypertension Sister     Diabetes Brother        Social History     Social History    Marital status:      Spouse name: N/A    Number of children: N/A    Years of education: N/A     Social History Main Topics    Smoking status: Former Smoker     Packs/day: 1.00     Years: 20.00     Types: Cigarettes     Quit date: 6/7/1974    Smokeless tobacco: Never Used    Alcohol use No    Drug use: No    Sexual activity: Yes     Partners: Female     Birth control/ protection: None     Other Topics Concern    Not on file     Social History Narrative       Vitals  There were no vitals taken for this visit. Done during PCP appt - /70 HR 40  and had not yet taken amlodipine, Imdur or carvedilol (in a napkin in his pocket along with calcium/vitamin D and Auryxia). Patient took amlodipine and Imdur during appointment with pharmacist. Carvedilol held pending dose reduction as discussed below. Data reviewed:    Lab Results   Component Value Date/Time    Sodium 137 05/29/2018 04:12 AM    Potassium 4.1 05/29/2018 04:12 AM    Chloride 106 05/29/2018 04:12 AM    CO2 21 05/29/2018 04:12 AM    Anion gap 10 05/29/2018 04:12 AM    Glucose 62 (L) 05/29/2018 04:12 AM    BUN 87 (H) 05/29/2018 04:12 AM    Creatinine 5.4 (H) 05/29/2018 04:12 AM    BUN/Creatinine ratio 18 03/04/2015 02:14 PM    GFR est AA 13.0 05/29/2018 04:12 AM    GFR est non-AA 11 05/29/2018 04:12 AM    Calcium 7.8 (L) 05/29/2018 04:12 AM    Bilirubin, total 0.6 08/21/2014 02:41 PM    AST (SGOT) 29 08/21/2014 02:41 PM    Alk.  phosphatase 85 08/21/2014 02:41 PM    Protein, total 6.5 08/21/2014 02:41 PM    Albumin 2.4 (L) 05/29/2018 04:12 AM    A-G Ratio 1.6 08/21/2014 02:41 PM    ALT (SGPT) 36 08/21/2014 02:41 PM     Lab Results   Component Value Date/Time    Hemoglobin A1c 6.2 05/26/2018 01:01 PM    Hemoglobin A1c (POC) 7.5 04/20/2017 08:15 AM     Lab Results   Component Value Date/Time    Cholesterol, total 247 (H) 05/26/2018 01:01 PM    HDL Cholesterol 61 05/26/2018 01:01 PM    LDL, calculated 161 (H) 05/26/2018 01:01 PM    VLDL, calculated 16.4 08/31/2016 11:42 AM    Triglyceride 123 05/26/2018 01:01 PM    CHOL/HDL Ratio 4.0 05/26/2018 01:01 PM       Allergies   Allergen Reactions    Iodinated Contrast- Oral And Iv Dye Other (comments)     PT HAS RENAL DISEASE AND IS UNABLE TO TOLERATE    Lisinopril Other (comments)     PT IS CURRENTLY TAKING THIS MEDICATION. IS NOT ALLERGIC    Tramadol Other (comments)     BRADYCARDIA       Medication List Prior to Visit:   Outpatient Medications Prior to Visit   Medication Sig Dispense Refill    Insulin Needles, Disposable, (OCTAVIO PEN NEEDLE) 32 gauge x 5/32\" ndle USE TWICE DAILY AS DIRECTED 200 Pen Needle 3    carvedilol (COREG) 25 mg tablet Take 1 Tab by mouth two (2) times daily (with meals). Indications: hypertension 60 Tab 5    atorvastatin (LIPITOR) 80 mg tablet TAKE 1 TABLET BY MOUTH EVERY NIGHT AT BEDTIME 30 Tab 6    isosorbide mononitrate ER (IMDUR) 30 mg tablet Take 1 Tab by mouth daily. 60 Tab 3    aspirin delayed-release 81 mg tablet TAKE 1 TABLET BY MOUTH EVERY DAY 60 Tab 3    furosemide (LASIX) 40 mg tablet TAKE 1 TABLET BY MOUTH EVERY DAY 60 Tab 3    amLODIPine (NORVASC) 10 mg tablet TAKE 1 TABLET BY MOUTH EVERY DAY 60 Tab 3    clopidogrel (PLAVIX) 75 mg tab Take 1 Tab by mouth daily. Indications: Acute Coronary Syndrome 30 Tab 2    ferrous sulfate 325 mg (65 mg iron) tablet Take  by mouth Daily (before breakfast).  glucose blood VI test strips (FREESTYLE PRECISION MESHA STRIPS) strip Use to check BS three times daily. Test before Breakfast, lunch and dinner. 100 Strip 3    calcium-cholecalciferol, D3, (CALCIUM 600 + D) tablet TAKE 1 TABLET BY MOUTH EVERY DAY 30 Tab 2    docusate sodium (COLACE) 50 mg capsule Take 1 Cap by mouth two (2) times daily as needed for Constipation. 60 Cap 5    omeprazole (PRILOSEC) 20 mg capsule TAKE 1 CAPSULE BY MOUTH EVERY DAY 60 Cap 0    lisinopril (PRINIVIL, ZESTRIL) 20 mg tablet TAKE 2 TABLETS BY MOUTH EVERY DAY 60 Tab 5    AURYXIA 210 mg iron tablet Take  by mouth three (3) times daily. 5    hydrALAZINE (APRESOLINE) 25 mg tablet Take 2 Tabs by mouth three (3) times daily.  Indications: hypertension 316 Tab 3    folic acid (FOLVITE) 1 mg tablet TAKE 1 TABLET BY MOUTH EVERY DAY 90 Tab 3    Insulin Lisp & Lisp Prot, Hum, (HUMALOG MIX 75-25 KWIKPEN) 100 unit/mL (75-25) inpn 10 units am and 8 units pm 3 Pen 3     No facility-administered medications prior to visit. Medications Discontinued / Updated During Visit:   There are no discontinued medications. Medication Adherence/Cost:    [facility] [id number] [place], [place] [address]. [address] [facility] [address]  Phone: [phone] Fax: [phone] Drug Store [address], [address] [facility] [address] [address]  Phone: [phone] Fax: [phone]    -Degree of difficulty affording medications: little    - Medications of most concern/difficulty to patient: meds taken multiple times of day due to irregular sleep/meal schedule  -Adherence to medications: reports except for atorvastatin which he was rarely taking prior to last week   - Missed doses occurring occasionally other than atorvastatin which he missed every day for months prior to last week   - Does patient use pillbox? Yes, NN provided patient with multi-compartment TID dosing pillbox last week   - Does patient have assistance in preparing medications? No (patient does not want assistance and does pillbox independently)   -Potential barriers to adherence include: understanding of medications/indications, lack of pillbox previously, does not want  assistance with managing pillbox/medications      Updated Medication List   Current Outpatient Prescriptions   Medication Sig    Insulin Needles, Disposable, (OCTAVIO PEN NEEDLE) 32 gauge x 5/32\" ndle USE TWICE DAILY AS DIRECTED    carvedilol (COREG) 25 mg tablet Take 1 Tab by mouth two (2) times daily (with meals). Indications: hypertension    atorvastatin (LIPITOR) 80 mg tablet TAKE 1 TABLET BY MOUTH EVERY NIGHT AT BEDTIME    isosorbide mononitrate ER (IMDUR) 30 mg tablet Take 1 Tab by mouth daily.  aspirin delayed-release 81 mg tablet TAKE 1 TABLET BY MOUTH EVERY DAY    furosemide (LASIX) 40 mg tablet TAKE 1 TABLET BY MOUTH EVERY DAY    amLODIPine (NORVASC) 10 mg tablet TAKE 1 TABLET BY MOUTH EVERY DAY    clopidogrel (PLAVIX) 75 mg tab Take 1 Tab by mouth daily. Indications: Acute Coronary Syndrome    glucose blood VI test strips (FREESTYLE PRECISION MESHA STRIPS) strip Use to check BS three times daily. Test before Breakfast, lunch and dinner.  calcium-cholecalciferol, D3, (CALCIUM 600 + D) tablet TAKE 1 TABLET BY MOUTH EVERY DAY    docusate sodium (COLACE) 50 mg capsule Take 1 Cap by mouth two (2) times daily as needed for Constipation.  lisinopril (PRINIVIL, ZESTRIL) 20 mg tablet TAKE 2 TABLETS BY MOUTH EVERY DAY    AURYXIA 210 mg iron tablet Take 210 mg by mouth Before breakfast, lunch, and dinner.  hydrALAZINE (APRESOLINE) 25 mg tablet Take 2 Tabs by mouth three (3) times daily. Indications: hypertension    folic acid (FOLVITE) 1 mg tablet TAKE 1 TABLET BY MOUTH EVERY DAY    Insulin Lisp & Lisp Prot, Hum, (HUMALOG MIX 75-25 KWIKPEN) 100 unit/mL (75-25) inpn 10 units am and 8 units pm     No current facility-administered medications for this visit. Assessment / Plan        A/P:   Medication Reconciliation/Education: completed during visit today. Reviewed all medications, indications, dosing and provided patient with written instructions in AVS to assist him at home. Reinforced importance of medication adherence and using pillbox consistently to prevent missed doses. Also educated patient on importance of trying to keep a consistent sleep/meal routine to help prevent missed doses. Advised patient that atorvastatin needs to be taken daily although he feels he is eating a healthy diet. Advised patient of ASCVD risk reduction and antiinflammatory/plaque stabilization benefits regardless of diet (patient post MI/CABG with vascular disease and diabetes).       BP was elevated during PCP visit today and heart rate was low (carvedilol had been increased last week at PCP visit) - discussed with Dr. Ofelia Ryan and will decrease carvedilol to 12.5 mg BID and increase hydralazine to 100 mg TID (new prescriptions issued by PCP). Patient was counseled to always take carvedilol with food to prevent rapid absorption/drops in blood pressure. Patient was also educated on the changes, provided written instructions and changes were written on his existing prescription bottles and he was advised to update pillbox when he picks up new prescriptions. Patient verbalized understanding of all information covered. Advised patient to talk about BP med schedule with Dr. Omayra Rivera (Nephrology) this Friday to see if he should split amlodipine and lisinopril up instead of taking them both in the morning. Patient was also advised to bring his updated medication list to the Nephrology appointment. Patient verbalized understanding of the information presented and all of the patients questions were answered. AVS from provider encounter was handed to the patient and reviewed in detail (AVS information also included in this encounter). Patient was advised to call the office with any additional questions or concerns. Follow-up: Nephrology later this week, PCP within a month per Dr. Ofelia Ryan. Patient was discussed with PCP during visit today and recommendations will be sent to Dr. Zach Ye MD for further review.     Thank you for the consult,  Tam Amaya, PHARMD, BCACP

## 2018-06-05 NOTE — PROGRESS NOTES
ROOM # [id number]  Identified pt with two pt identifiers(name and ). Reviewed record in preparation for visit and have obtained necessary documentation. Chief Complaint   Patient presents with    Follow-up     htn      Norberta Oppenheim preferred language for health care discussion is english/other. Is the patient using any DME equipment during OV? NO    Norberta Oppenheim is due for:  Health Maintenance Due   Topic    Pneumococcal 65+ Low/Medium Risk (2 of 2 - PPSV23)    MICROALBUMIN Q1     FOOT EXAM Q1     EYE EXAM RETINAL OR DILATED Q1     MEDICARE YEARLY EXAM      Health Maintenance reviewed and discussed per provider  Please order/place referral if appropriate. Advance Directive:  1. Do you have an advance directive in place? Patient Reply: NO    2. If not, would you like material regarding how to put one in place? NO    Coordination of Care:  1. Have you been to the ER, urgent care clinic since your last visit? Hospitalized since your last visit? NO    2. Have you seen or consulted any other health care providers outside of the [facility] since your last visit? Include any pap smears or colon screening. NO    Patient is accompanied by self I have received verbal consent from Norberta Oppenheim to discuss any/all medical information while they are present in the room.     Learning Assessment:  Learning Assessment 2015   PRIMARY LEARNER Patient Patient   HIGHEST LEVEL OF EDUCATION - PRIMARY LEARNER  GRADUATED HIGH SCHOOL OR GED GRADUATED HIGH SCHOOL OR GED   PRIMARY LANGUAGE ENGLISH ENGLISH    NEED - No   LEARNER PREFERENCE PRIMARY DEMONSTRATION VIDEOS   LEARNING SPECIAL TOPICS - no   ANSWERED BY patient patient   RELATIONSHIP SELF SELF     Depression Screening:  PHQ over the last two weeks 2018 2018 2018 2/15/2018 2017 2017 2017   Little interest or pleasure in doing things Not at all Not at all Not at all Not at all Not at all Not at all Nearly every day   Feeling down, depressed or hopeless Not at all Not at all Not at all Not at all Not at all Not at all Several days   Total Score PHQ 2 0 0 0 0 0 0 4     Abuse Screening:  Abuse Screening Questionnaire 6/2/2017 5/11/2016 6/8/2015 3/4/2015   Do you ever feel afraid of your partner? N N N N   Are you in a relationship with someone who physically or mentally threatens you? N N N N   Is it safe for you to go home? Olivia Parham     Fall Risk  Fall Risk Assessment, last 12 mths 6/5/2018 5/30/2018 5/21/2018 2/15/2018 6/13/2017 6/2/2017 5/2/2017   Able to walk? Yes Yes Yes Yes Yes Yes Yes   Fall in past 12 months?  No No No No No No No

## 2018-06-05 NOTE — PATIENT INSTRUCTIONS
Medication changes  Increase hydralazine to 100 mg three times daily (new prescription sent to pharmacy). Try to separate doses by 6-8 hours if possible. Decrease carvedilol (Coreg) to 12.5 mg twice daily WITH FOOD/MEALS (new prescription sent to pharmacy). If you want to use up the carvedilol 25 mg tabs that you have first, you would need to take one-half tablet (12.5 mg) twice daily with meals. You will need to update your pillbox with these new medications. Please keep appointment with Nephrology this Friday and schedule follow-up with Dr. Compa Cooper within the next month. Please keep checking your blood pressures at home and bring your log to your follow-up appointment.       Amlodipine (Norvasc) - blood pressure, can be taken on an empty stomach  Aspirin - for your heart, can be taken with or without food but if it upsets your stomach - take with food  Atorvastatin (Lipitor) - cholesterol, best taken at night but if you have problems remembering - can be taken in the morning  Auryxia - iron supplement and phosphate binding agent for kidneys - take before every meal  Calcium/Vitamin D - supplement, usually take with a meal  Carvedilolol (Coreg) - blood pressure and heart, take twice daily with food/meals  Clopidogrel (Plavix)- for you heart/to prevent clots/strokes/heart attacks, take daily  Docusate (Colace) - stool softener, take as needed  Folic acid - supplement/anemia, take at any time of day  Furosemide (Lasix) - fluid pill, take in the morning  Hydralazine (Apresoline) - blood pressure, take three times a day  Insulin - take twice daily as directed  Isosorbide mononitrate (Imdur) - heart, take once daily in the morning  Lisinopril (Zestril) - blood pressure/heart - take once daily

## 2018-06-05 NOTE — PATIENT INSTRUCTIONS
Medication changes  Increase hydralazine to 100 mg three times daily (new prescription sent to pharmacy). Try to separate doses by 6-8 hours if possible. Decrease carvedilol (Coreg) to 12.5 mg twice daily WITH FOOD/MEALS (new prescription sent to pharmacy). If you want to use up the carvedilol 25 mg tabs that you have first, you would need to take one-half tablet (12.5 mg) twice daily with meals. You will need to update your pillbox with these new medications. Please keep appointment with Nephrology this Friday and schedule follow-up with Dr. Deo Arcos within the next month. Please keep checking your blood pressures at home and bring your log to your follow-up appointment.       Amlodipine (Norvasc) - blood pressure, can be taken on an empty stomach  Aspirin - for your heart, can be taken with or without food but if it upsets your stomach - take with food  Atorvastatin (Lipitor) - cholesterol, best taken at night but if you have problems remembering - can be taken in the morning  Auryxia - iron supplement and phosphate binding agent for kidneys - take before every meal  Calcium/Vitamin D - supplement, usually take with a meal  Carvedilolol (Coreg) - blood pressure and heart, take twice daily with food/meals  Clopidogrel (Plavix)- for you heart/to prevent clots/strokes/heart attacks, take daily  Docusate (Colace) - stool softener, take as needed  Folic acid - supplement/anemia, take at any time of day  Furosemide (Lasix) - fluid pill, take in the morning  Hydralazine (Apresoline) - blood pressure, take three times a day  Insulin - take twice daily as directed  Isosorbide mononitrate (Imdur) - heart, take once daily in the morning  Lisinopril (Zestril) - blood pressure/heart - take once daily

## 2018-06-05 NOTE — MR AVS SNAPSHOT
[address] [place] [address] 
 
 
 [address] [facility] [id number] 
[phone] Patient: Mortimer Andes MRN: [id number] WCA:[id number] Visit Information Date & Time Provider Department Dept. Phone Encounter #  
 6/5/2018  8:15 AM [facility] , [address] [phone] [id number] Upcoming Health Maintenance Date Due Pneumococcal 65+ Low/Medium Risk (2 of 2 - PPSV23) 11/11/2010 MICROALBUMIN Q1 12/6/2017 FOOT EXAM Q1 4/20/2018 EYE EXAM RETINAL OR DILATED Q1 5/18/2018 MEDICARE YEARLY EXAM 6/3/2018 Influenza Age 5 to Adult 8/1/2018 HEMOGLOBIN A1C Q6M 11/26/2018 GLAUCOMA SCREENING Q2Y 5/18/2019 LIPID PANEL Q1 5/26/2019 COLONOSCOPY 7/8/2021 DTaP/Tdap/Td series (2 - Td) 6/2/2027 Allergies as of 6/5/2018  Review Complete On: 6/5/2018 By: Teagan Thompson, PHARMD  
  
 Severity Noted Reaction Type Reactions Iodinated Contrast- Oral And Iv Dye  06/07/2017    Other (comments) PT HAS RENAL DISEASE AND IS UNABLE TO TOLERATE Lisinopril  11/20/2014    Other (comments) PT IS CURRENTLY TAKING THIS MEDICATION. IS NOT ALLERGIC Tramadol  06/07/2017   Systemic Other (comments) BRADYCARDIA Current Immunizations  Reviewed on 6/4/2018 Name Date Influenza High Dose Vaccine PF 11/3/2017, 10/1/2015 Influenza Vaccine Split 11/1/2012 Pneumococcal Vaccine (Unspecified Type) 11/11/2005 Not reviewed this visit You Were Diagnosed With   
  
 Codes Comments Essential hypertension    -  Primary ICD-10-CM: I10 
ICD-9-CM: 401.9 CKD (chronic kidney disease) stage 5, GFR less than 15 ml/min (HCC)     ICD-10-CM: N18.5 ICD-9-CM: 555. 5 Vitals BP Pulse Temp Resp Height(growth percentile) Weight(growth percentile) (!) 204/70 (BP 1 Location: Left arm, BP Patient Position: Sitting) (!) 40 96.4 °F (35.8 °C) (Oral) 18 5' 7\" (1.702 m) 143 lb 12.8 oz (65.2 kg) SpO2 BMI Smoking Status 100% 22.52 kg/m2 Former Smoker Vitals History BMI and BSA Data Body Mass Index Body Surface Area  
 22.52 kg/m 2 1.76 m 2 Preferred Pharmacy Pharmacy Name Phone [facility], [facility]  [address] [phone] Your Updated Medication List  
  
   
This list is accurate as of 6/5/18  9:51 AM.  Always use your most recent med list. amLODIPine 10 mg tablet Commonly known as:  Rainer Lute TAKE 1 TABLET BY MOUTH EVERY DAY  
  
 aspirin delayed-release 81 mg tablet TAKE 1 TABLET BY MOUTH EVERY DAY  
  
 atorvastatin 80 mg tablet Commonly known as:  LIPITOR  
TAKE 1 TABLET BY MOUTH EVERY NIGHT AT BEDTIME  
  
 AURYXIA 210 mg iron tablet Generic drug:  ferric citrate Take 210 mg by mouth Before breakfast, lunch, and dinner. calcium-cholecalciferol (D3) tablet Commonly known as:  Calcium 600 + D  
TAKE 1 TABLET BY MOUTH EVERY DAY  
  
 carvedilol 12.5 mg tablet Commonly known as:  Kary Ready Take 1 Tab by mouth two (2) times daily (with meals). Indications: hypertension  
  
 clopidogrel 75 mg Tab Commonly known as:  PLAVIX Take 1 Tab by mouth daily. Indications: Acute Coronary Syndrome  
  
 docusate sodium 50 mg capsule Commonly known as:  James Stai Take 1 Cap by mouth two (2) times daily as needed for Constipation. folic acid 1 mg tablet Commonly known as:  FOLVITE  
TAKE 1 TABLET BY MOUTH EVERY DAY  
  
 furosemide 40 mg tablet Commonly known as:  LASIX TAKE 1 TABLET BY MOUTH EVERY DAY  
  
 glucose blood VI test strips strip Commonly known as:  FREESTYLE PRECISION MESHA STRIPS Use to check BS three times daily. Test before Breakfast, lunch and dinner. hydrALAZINE 100 mg tablet Commonly known as:  APRESOLINE Take 1 Tab by mouth three (3) times daily. Indications: hypertension  
  
 insulin lispro  & lisp protamine (human) 100 unit/mL (75-25) Inpn Commonly known as:  HumaLOG Mix 75-25 KwikPen 10 units am and 8 units pm  
  
 Insulin Needles (Disposable) 32 gauge x 5/32\" Ndle Commonly known as:  Rose Pen Needle USE TWICE DAILY AS DIRECTED  
  
 isosorbide mononitrate ER 30 mg tablet Commonly known as:  IMDUR Take 1 Tab by mouth daily. lisinopril 20 mg tablet Commonly known as:  PRINIVIL, ZESTRIL  
TAKE 2 TABLETS BY MOUTH EVERY DAY Prescriptions Sent to Pharmacy Refills  
 hydrALAZINE (APRESOLINE) 100 mg tablet 3 Sig: Take 1 Tab by mouth three (3) times daily. Indications: hypertension Class: Normal  
 Pharmacy: [facility] [address] [place] [address], [address] [place] [address] [facility] [phone] Route: Oral  
 carvedilol (COREG) 12.5 mg tablet 3 Sig: Take 1 Tab by mouth two (2) times daily (with meals). Indications: hypertension Class: Normal  
 Pharm[facility] [place] [address] [facility] [phone] Route: Oral  
  
Patient Instructions Medication changes Increase hydralazine to 100 mg three times daily (new prescription sent to pharmacy). Try to separate doses by 6-8 hours if possible. Decrease carvedilol (Coreg) to 12.5 mg twice daily WITH FOOD/MEALS (new prescription sent to pharmacy). If you want to use up the carvedilol 25 mg tabs that you have first, you would need to take one-half tablet (12.5 mg) twice daily with meals. You will need to update your pillbox with these new medications. Please keep appointment with Nephrology this Friday and schedule follow-up with Dr. Rosey Harris within the next month. Please keep checking your blood pressures at home and bring your log to your follow-up appointment. Amlodipine (Norvasc) - blood pressure, can be taken on an empty stomach Aspirin - for your heart, can be taken with or without food but if it upsets your stomach - take with food Atorvastatin (Lipitor) - cholesterol, best taken at night but if you have problems remembering - can be taken in the morning Auryxia - iron supplement and phosphate binding agent for kidneys - take before every meal 
Calcium/Vitamin D - supplement, usually take with a meal 
Carvedilolol (Coreg) - blood pressure and heart, take twice daily with food/meals Clopidogrel (Plavix)- for you heart/to prevent clots/strokes/heart attacks, take daily Docusate (Colace) - stool softener, take as needed Folic acid - supplement/anemia, take at any time of day Furosemide (Lasix) - fluid pill, take in the morning Hydralazine (Apresoline) - blood pressure, take three times a day Insulin - take twice daily as directed Isosorbide mononitrate (Imdur) - heart, take once daily in the morning Lisinopril (Zestril) - blood pressure/heart - take once daily Introducing [facility]! [facility] introduces Black coin patient portal. Now you can access parts of your medical record, email your doctor's office, and request medication refills online. 1. In your internet browser, go to [email or web address] 2. Click on the First Time User? Click Here link in the Sign In box. You will see the New Member Sign Up page. 3. Enter your Black coin Access Code exactly as it appears below. You will not need to use this code after youve completed the sign-up process. If you do not sign up before the expiration date, you must request a new code. · Black coin Access Code: [email or web address] Expires: 8/19/2018 10:02 AM 
 
4. Enter the last four digits of your Social Security Number (xxxx) and Date of Birth (mm/dd/yyyy) as indicated and click Submit. You will be taken to the next sign-up page. 5. Create a WhoWannat ID. This will be your WhoWannat login ID and cannot be changed, so think of one that is secure and easy to remember. 6. Create a nuPSYS password. You can change your password at any time. 7. Enter your Password Reset Question and Answer. This can be used at a later time if you forget your password. 8. Enter your e-mail address. You will receive e-mail notification when new information is available in [address] 9. Click Sign Up. You can now view and download portions of your medical record. 10. Click the Download Summary menu link to download a portable copy of your medical information. If you have questions, please visit the Frequently Asked Questions section of the nuPSYS website. Remember, nuPSYS is NOT to be used for urgent needs. For medical emergencies, dial 911. Now available from your iPhone and Android! Please provide this summary of care documentation to your next provider. Your primary care clinician is listed as Tera Mensah. If you have any questions after today's visit, please call [phone].

## 2018-06-05 NOTE — PROGRESS NOTES
HISTORY OF PRESENT ILLNESS  Zack Armendariz is a 77 y.o. male. HPI Comments: 76 yo male here for f/u of HTN. Did not take all of his medication this morning. Has appt with Pharm D following our visit. Checking BP at home,  this morning. Range per memory 129-218/64-92. Denies CP, SOB. Has nephrology appt on Friday. Repeat BP with his cuff at office: 222/71  Manual BP: 204/70  Feels tired with bradycardia since increase in Coreg. Review of Systems   Constitutional: Positive for malaise/fatigue. Negative for chills, fever and weight loss. HENT: Negative for congestion and ear pain. Eyes: Negative for blurred vision and pain. Respiratory: Negative for cough and shortness of breath. Cardiovascular: Negative for chest pain, palpitations and leg swelling. Gastrointestinal: Negative for nausea and vomiting. Genitourinary: Negative for frequency and urgency. Musculoskeletal: Negative for joint pain and myalgias. Skin: Negative for itching and rash. Neurological: Negative for dizziness, tingling and headaches. Psychiatric/Behavioral: Negative for depression. The patient is not nervous/anxious.       Past Medical History:   Diagnosis Date    [facility] [facility]     Anemia in chronic kidney disease     Arthritis     ASCVD (arteriosclerotic cardiovascular disease)     Benign hypertensive kidney disease     Chronic kidney disease     STAGE 3    CKD (chronic kidney disease) stage 2, GFR 60-89 ml/min 4/18/2013    CKD (chronic kidney disease), stage III     Diabetes mellitus with chronic kidney disease [facility] [facility]     Dialysis patient ([facility] [address] 09/2014    NOT CURRENTLY ON DIALYSIS    Dyslipidemia     GERD (gastroesophageal reflux disease)     Glaucoma     Heart attack ([address] 08/2014    Hypertension     Left renal artery stenosis (HCC)     NSTEMI (non-ST elevated myocardial infarction) ([address]     PAD (peripheral artery disease) (HCC)     PAF (paroxysmal atrial fibrillation) ([address] 9/2014    Pulmonary asbestosis ([address]     Renal mass, right     Stroke [facility])     TIA     Current Outpatient Prescriptions on File Prior to Visit   Medication Sig Dispense Refill    Insulin Needles, Disposable, (OCTAVIO PEN NEEDLE) 32 gauge x 5/32\" ndle USE TWICE DAILY AS DIRECTED 200 Pen Needle 3    carvedilol (COREG) 25 mg tablet Take 1 Tab by mouth two (2) times daily (with meals). Indications: hypertension 60 Tab 5    atorvastatin (LIPITOR) 80 mg tablet TAKE 1 TABLET BY MOUTH EVERY NIGHT AT BEDTIME 30 Tab 6    isosorbide mononitrate ER (IMDUR) 30 mg tablet Take 1 Tab by mouth daily. 60 Tab 3    aspirin delayed-release 81 mg tablet TAKE 1 TABLET BY MOUTH EVERY DAY 60 Tab 3    furosemide (LASIX) 40 mg tablet TAKE 1 TABLET BY MOUTH EVERY DAY 60 Tab 3    amLODIPine (NORVASC) 10 mg tablet TAKE 1 TABLET BY MOUTH EVERY DAY 60 Tab 3    clopidogrel (PLAVIX) 75 mg tab Take 1 Tab by mouth daily. Indications: Acute Coronary Syndrome 30 Tab 2    ferrous sulfate 325 mg (65 mg iron) tablet Take  by mouth Daily (before breakfast).  glucose blood VI test strips (FREESTYLE PRECISION MESHA STRIPS) strip Use to check BS three times daily. Test before Breakfast, lunch and dinner. 100 Strip 3    calcium-cholecalciferol, D3, (CALCIUM 600 + D) tablet TAKE 1 TABLET BY MOUTH EVERY DAY 30 Tab 2    docusate sodium (COLACE) 50 mg capsule Take 1 Cap by mouth two (2) times daily as needed for Constipation. 60 Cap 5    omeprazole (PRILOSEC) 20 mg capsule TAKE 1 CAPSULE BY MOUTH EVERY DAY 60 Cap 0    lisinopril (PRINIVIL, ZESTRIL) 20 mg tablet TAKE 2 TABLETS BY MOUTH EVERY DAY 60 Tab 5    AURYXIA 210 mg iron tablet Take  by mouth three (3) times daily. 5    hydrALAZINE (APRESOLINE) 25 mg tablet Take 2 Tabs by mouth three (3) times daily.  Indications: hypertension 382 Tab 3    folic acid (FOLVITE) 1 mg tablet TAKE 1 TABLET BY MOUTH EVERY DAY 90 Tab 3    Insulin Lisp & Lisp Prot, Hum, (HUMALOG MIX 75-25 KWIKPEN) 100 unit/mL (75-25) inpn 10 units am and 8 units pm 3 Pen 3     No current facility-administered medications on file prior to visit. Physical Exam   Constitutional: He appears well-developed and well-nourished. No distress. BP (!) 204/70 (BP 1 Location: Left arm, BP Patient Position: Sitting)  Pulse (!) 40  Temp 96.4 °F (35.8 °C) (Oral)   Resp 18  Ht 5' 7\" (1.702 m)  Wt 143 lb 12.8 oz (65.2 kg)  SpO2 100%  BMI 22.52 kg/m2     Eyes: EOM are normal. Right eye exhibits no discharge. Left eye exhibits no discharge. No scleral icterus. Neck: Neck supple. Cardiovascular: Regular rhythm and normal heart sounds. Bradycardia present. Exam reveals no gallop and no friction rub. No murmur heard. Pulmonary/Chest: Effort normal and breath sounds normal. No respiratory distress. He has no wheezes. He has no rales. Musculoskeletal: He exhibits no edema or tenderness. Lymphadenopathy:     He has no cervical adenopathy. Neurological: He is alert. He exhibits normal muscle tone. Skin: Skin is warm and dry. Psychiatric: He has a normal mood and affect. Lab Results   Component Value Date/Time    Sodium 137 05/29/2018 04:12 AM    Potassium 4.1 05/29/2018 04:12 AM    Chloride 106 05/29/2018 04:12 AM    CO2 21 05/29/2018 04:12 AM    Anion gap 10 05/29/2018 04:12 AM    Glucose 62 (L) 05/29/2018 04:12 AM    BUN 87 (H) 05/29/2018 04:12 AM    Creatinine 5.4 (H) 05/29/2018 04:12 AM    BUN/Creatinine ratio 18 03/04/2015 02:14 PM    GFR est AA 13.0 05/29/2018 04:12 AM    GFR est non-AA 11 05/29/2018 04:12 AM    Calcium 7.8 (L) 05/29/2018 04:12 AM    Bilirubin, total 0.6 08/21/2014 02:41 PM    AST (SGOT) 29 08/21/2014 02:41 PM    Alk. phosphatase 85 08/21/2014 02:41 PM    Protein, total 6.5 08/21/2014 02:41 PM    Albumin 2.4 (L) 05/29/2018 04:12 AM    A-G Ratio 1.6 08/21/2014 02:41 PM    ALT (SGPT) 36 08/21/2014 02:41 PM     ASSESSMENT and PLAN    ICD-10-CM ICD-9-CM    1. Essential hypertension I10 401.9    2.  CKD (chronic kidney disease) stage 5, GFR less than 15 ml/min ([facility]) N18.5 585.5      Pt to meet with PharmD today for medication review. Will decrease Coreg to 12.5mg BID given bradycardia. Increased hydralazine to 100mg TID  Continue to monitor BP at home as his cuff seems to be accurate.

## 2018-06-28 PROBLEM — N18.6 ESRD (END STAGE RENAL DISEASE) (HCC): Status: ACTIVE | Noted: 2018-01-01

## 2018-06-28 NOTE — PROGRESS NOTES
HISTORY OF PRESENT ILLNESS  Chioma Burt is a 77 y.o. male. HPI Comments: 78 yo male here for f/u of HTN, DM. Started HD ~ 2 weeks ago. BP has been doing better with this. Notes it is elevated sometimes on dialysis days as he does not take his medication before this. No CP, SOB. DM: Last A1c excellent. Has been tapering down on insulin dose given good control    Follow-up   Pertinent negatives include no chest pain, no headaches and no shortness of breath. Review of Systems   Constitutional: Negative for chills, fever and weight loss. HENT: Negative for congestion and ear pain. Eyes: Negative for blurred vision and pain. Respiratory: Negative for cough and shortness of breath. Cardiovascular: Negative for chest pain, palpitations and leg swelling. Gastrointestinal: Negative for nausea and vomiting. Genitourinary: Negative for frequency and urgency. Musculoskeletal: Negative for joint pain and myalgias. Skin: Negative for itching and rash. Neurological: Negative for dizziness, tingling and headaches. Psychiatric/Behavioral: Negative for depression. The patient is not nervous/anxious.       Past Medical History:   Diagnosis Date    Anemia in chronic kidney disease     Arthritis     ASCVD (arteriosclerotic cardiovascular disease)     Benign hypertensive kidney disease     Chronic kidney disease     STAGE 3    Diabetes mellitus with chronic kidney disease ([facility]     Dialysis patient ([facility] 09/2014    Dyslipidemia     GERD (gastroesophageal reflux disease)     Glaucoma     Heart attack ([facility] [address] [facility] 08/2014    Hypertension     Left renal artery stenosis (HCC)     NSTEMI (non-ST elevated myocardial infarction) ([facility]     PAD (peripheral artery disease) (HCC)     PAF (paroxysmal atrial fibrillation) ([facility] [address] [facility] 9/2014    Pulmonary asbestosis ([facility] [address] [facility]     Renal mass, right     Stroke [facility])     TIA     Current Outpatient Prescriptions on File Prior to Visit   Medication Sig Dispense Refill    hydrALAZINE (APRESOLINE) 100 mg tablet Take 1 Tab by mouth three (3) times daily. Indications: hypertension 270 Tab 3    carvedilol (COREG) 12.5 mg tablet Take 1 Tab by mouth two (2) times daily (with meals). Indications: hypertension 180 Tab 3    Insulin Needles, Disposable, (OCTAVIO PEN NEEDLE) 32 gauge x 5/32\" ndle USE TWICE DAILY AS DIRECTED 200 Pen Needle 3    atorvastatin (LIPITOR) 80 mg tablet TAKE 1 TABLET BY MOUTH EVERY NIGHT AT BEDTIME 30 Tab 6    isosorbide mononitrate ER (IMDUR) 30 mg tablet Take 1 Tab by mouth daily. 60 Tab 3    aspirin delayed-release 81 mg tablet TAKE 1 TABLET BY MOUTH EVERY DAY 60 Tab 3    amLODIPine (NORVASC) 10 mg tablet TAKE 1 TABLET BY MOUTH EVERY DAY 60 Tab 3    glucose blood VI test strips (FREESTYLE PRECISION MESHA STRIPS) strip Use to check BS three times daily. Test before Breakfast, lunch and dinner. 100 Strip 3    calcium-cholecalciferol, D3, (CALCIUM 600 + D) tablet TAKE 1 TABLET BY MOUTH EVERY DAY 30 Tab 2    docusate sodium (COLACE) 50 mg capsule Take 1 Cap by mouth two (2) times daily as needed for Constipation. 60 Cap 5    lisinopril (PRINIVIL, ZESTRIL) 20 mg tablet TAKE 2 TABLETS BY MOUTH EVERY DAY 60 Tab 5    AURYXIA 210 mg iron tablet Take 210 mg by mouth Before breakfast, lunch, and dinner. 5    Insulin Lisp & Lisp Prot, Hum, (HUMALOG MIX 75-25 KWIKPEN) 100 unit/mL (75-25) inpn 10 units am and 8 units pm 3 Pen 3     No current facility-administered medications on file prior to visit. Physical Exam   Constitutional: He appears well-developed and well-nourished. No distress. /42 (BP 1 Location: Left arm, BP Patient Position: Sitting)  Pulse 60  Temp 98.4 °F (36.9 °C) (Oral)   Resp 18  Ht 5' 7\" (1.702 m)  Wt 134 lb (60.8 kg)  SpO2 98%  BMI 20.99 kg/m2     Eyes: EOM are normal. Right eye exhibits no discharge. Left eye exhibits no discharge. No scleral icterus. Neck: Neck supple.    Cardiovascular: Normal rate, regular rhythm and normal heart sounds. Exam reveals no gallop and no friction rub. No murmur heard. Pulmonary/Chest: Effort normal and breath sounds normal. No respiratory distress. He has no wheezes. He has no rales. Musculoskeletal: He exhibits no edema or tenderness. Lymphadenopathy:     He has no cervical adenopathy. Neurological: He is alert. He exhibits normal muscle tone. Skin: Skin is warm and dry. Psychiatric: He has a normal mood and affect. Lab Results   Component Value Date/Time    Hemoglobin A1c 6.2 05/26/2018 01:01 PM    Hemoglobin A1c (POC) 7.5 04/20/2017 08:15 AM       ASSESSMENT and PLAN    ICD-10-CM ICD-9-CM    1. ESRD (end stage renal disease) ([facility] N18.6 585.6    2. Essential hypertension I10 401.9    3. Diabetic nephropathy associated with type 2 diabetes mellitus (HCC) E11.21 250.40      583.81      Doing well on HD at this time. BP stable. Can continue current medication but may need to be adjusted if BP low given HD  DM has been doing very well. He has been adjusting down on evening insulin to avoid hypoglycemia.

## 2018-06-28 NOTE — PATIENT INSTRUCTIONS
Nutrition Tips for Diabetes in Children: Care Instructions  Your Care Instructions    When your child has diabetes, it's very important to control his or her blood sugar. This means that you need to pay attention to how often and how much your child eats certain foods. But your child can still eat what your family eats. This includes occasional sweets and other favorites. Make sure that your child eats a variety of foods. It's also important to spread carbohydrate throughout the day. Carbohydrate raises blood sugar more than any other nutrient. It's found in sugar, breads, and cereals. It's also found in fruit, starchy vegetables like potatoes and corn, milk, and yogurt. You may want to plan your child's meals and snacks with a dietitian or diabetes educator. They can help you choose the best foods and help with weight loss, if that's a goal. The tips below may also help your child enjoy meals and stay healthy. Follow-up care is a key part of your child's treatment and safety. Be sure to make and go to all appointments, and call your doctor if your child is having problems. It's also a good idea to know your child's test results and keep a list of the medicines your child takes. How can you care for your child at home? · Learn which foods have carbohydrate (carbs). And learn how much is okay for your child. A dietitian or diabetes educator can help you and your child keep track of carbs. · Spread your child's carbs throughout the day. You want your child to eat some at all meals. But you don't want your child to eat too much at one time. · Plan meals to include food from all the food groups. These are the food groups and some example serving sizes:  ¨ Grains: 1 slice of bread (1 ounce), ½ cup of cooked cereal, or 1/3 cup of cooked pasta or rice. These have about 15 grams of carbohydrate in a serving. Choose whole grains when you can.  These include whole wheat bread or crackers, oatmeal, and Rema Ode rice.  ¨ Fruit: 1 small fresh fruit, such as an apple or orange; half of a banana; ½ cup of chopped, cooked, or canned fruit; ½ cup of fruit juice; 1 cup of melon or raspberries; or 2 tablespoons of dried fruit. These have about 15 grams of carbohydrate in a serving. ¨ Dairy: 1 cup of nonfat or low-fat milk or 2/3 cup of plain yogurt. These have about 15 grams of carbohydrate in a serving. ¨ Protein foods: A serving size of meat is 3 ounces. That's about the size of a deck of cards. Examples of other serving sizes of protein foods (equal to 1 ounce of meat) are 1/4 cup of cottage cheese, 1 egg, 1 tablespoon of peanut butter, and ½ cup of tofu. These have very little or no carbs per serving. ¨ Vegetables: Starchy vegetables have about 15 grams of carbohydrate. A serving is ½ cup of cooked beans, peas, potatoes, or corn. Nonstarchy vegetables have very little carbohydrate. A serving is 1 cup of raw leafy vegetables, ½ cup of other vegetables, or 3/4 cup of vegetable juice. · Use the plate format to plan your child's meals. It is a good, quick way to make sure that your child has a balanced meal. It also helps you spread your child's carbs throughout the day. Divide your child's plate by types of foods. Put vegetables on half the plate. Put meat or other proteins on one-quarter of the plate. And put a grain or starchy vegetable (such as brown rice or a potato) in the last quarter. You may also be able to add a small piece of fruit and 1 cup of milk or yogurt. But it depends on how much carbohydrate your child is supposed to eat. · Talk to your dietitian or diabetes educator about ways to add limited sweets. Your child can eat sweets once in a while. But you need to count the amount of carbs as part the daily amount. · Protein, fat, and fiber do not raise blood sugar as much as carbs do. Meals with a lot of these nutrients will help keep your child's blood sugar from rising quickly. · Limit saturated fats.  These include fats in meat and dairy products. Try to replace them with small amounts of monounsaturated fat, such as olive oil. This can help protect your child's heart. People who have diabetes are at higher risk of heart disease. · Ask your doctor about what type of daily activity is safe for your child. Exercise can help manage blood sugar. It can also make your child feel good and have more energy. When your child eats out  · It's a good idea for you and your child to learn to estimate the serving sizes of foods that have carbohydrate. If you measure food at home, it will be easier to estimate the amount in a serving of restaurant food. · If the meal you order for your child has too much carbohydrate (such as potatoes, corn, or baked beans), ask to have a low-carbohydrate food instead. Ask for a salad or green vegetables. · If your child uses insulin, check his or her blood sugar before and after eating out. This can help you and your child plan how much to eat in the future. Where can you learn more? Go to http://melissa-carter.info/. Enter P102 in the search box to learn more about \"Nutrition Tips for Diabetes in Children: Care Instructions. \"  Current as of: March 13, 2017  Content Version: 11.4  © 5685-0723 Healthwise, Incorporated. Care instructions adapted under license by [employer] (which disclaims liability or warranty for this information). If you have questions about a medical condition or this instruction, always ask your healthcare professional. [place] [address] any warranty or liability for your use of this information.

## 2018-06-28 NOTE — PROGRESS NOTES
ROOM # 18  Identified pt with two pt identifiers(name and ). Reviewed record in preparation for visit and have obtained necessary documentation. Chief Complaint   Patient presents with    Follow-up     htn,new dialysis      Darrick Hayes preferred language for health care discussion is english/other. Is the patient using any DME equipment during OV? NO    Darrick Hayes is due for:  Health Maintenance Due   Topic    Pneumococcal 65+ Low/Medium Risk (2 of 2 - PPSV23)    MICROALBUMIN Q1     FOOT EXAM Q1     EYE EXAM RETINAL OR DILATED Q1     MEDICARE YEARLY EXAM      Health Maintenance reviewed and discussed per provider  Please order/place referral if appropriate. Advance Directive:  1. Do you have an advance directive in place? Patient Reply: NO    2. If not, would you like material regarding how to put one in place? NO    Coordination of Care:  1. Have you been to the ER, urgent care clinic since your last visit? Hospitalized since your last visit? NO    2. Have you seen or consulted any other health care providers outside of the [facility] since your last visit? Include any pap smears or colon screening. YES    Patient is accompanied by self I have received verbal consent from Darrick Hayes to discuss any/all medical information while they are present in the room.     Learning Assessment:  Learning Assessment 2015   PRIMARY LEARNER Patient Patient   HIGHEST LEVEL OF EDUCATION - PRIMARY LEARNER  GRADUATED HIGH SCHOOL OR GED GRADUATED HIGH SCHOOL OR GED   PRIMARY LANGUAGE ENGLISH ENGLISH    NEED - No   LEARNER PREFERENCE PRIMARY DEMONSTRATION VIDEOS   LEARNING SPECIAL TOPICS - no   ANSWERED BY patient patient   RELATIONSHIP SELF SELF     Depression Screening:  PHQ over the last two weeks 2018 2018 2018 2018 2/15/2018 2017 2017   Little interest or pleasure in doing things Not at all Not at all Not at all Not at all Not at all Not at all Not at all   Feeling down, depressed or hopeless Not at all Not at all Not at all Not at all Not at all Not at all Not at all   Total Score PHQ 2 0 0 0 0 0 0 0     Abuse Screening:  Abuse Screening Questionnaire 6/2/2017 5/11/2016 6/8/2015 3/4/2015   Do you ever feel afraid of your partner? N N N N   Are you in a relationship with someone who physically or mentally threatens you? N N N N   Is it safe for you to go home? Olivia Parham     Fall Risk  Fall Risk Assessment, last 12 mths 6/28/2018 6/5/2018 5/30/2018 5/21/2018 2/15/2018 6/13/2017 6/2/2017   Able to walk? Yes Yes Yes Yes Yes Yes Yes   Fall in past 12 months?  No No No No No No No

## 2018-06-28 NOTE — MR AVS SNAPSHOT
[address] [facility] 
 
 
 [address] [facility] [address] [address] 
[phone] Patient: Dashawn Rangel MRN: [id number] Visit Information Date & Time Provider Department Dept. Phone Encounter #  
 6/28/2018 11:15 AM Johann Lori, [address] [phone] [id number] Follow-up Instructions Return if symptoms worsen or fail to improve. Upcoming Health Maintenance Date Due Pneumococcal 65+ Low/Medium Risk (2 of 2 - PPSV23) 11/11/2010 MICROALBUMIN Q1 12/6/2017 FOOT EXAM Q1 4/20/2018 EYE EXAM RETINAL OR DILATED Q1 5/18/2018 MEDICARE YEARLY EXAM 6/3/2018 Influenza Age 5 to Adult 8/1/2018 HEMOGLOBIN A1C Q6M 11/26/2018 GLAUCOMA SCREENING Q2Y 5/18/2019 LIPID PANEL Q1 5/26/2019 COLONOSCOPY 7/8/2021 DTaP/Tdap/Td series (2 - Td) 6/2/2027 Allergies as of 6/28/2018  Review Complete On: 6/5/2018 By: Sammi Bob PHARMD  
  
 Severity Noted Reaction Type Reactions Iodinated Contrast- Oral And Iv Dye  06/07/2017    Other (comments) PT HAS RENAL DISEASE AND IS UNABLE TO TOLERATE Lisinopril  11/20/2014    Other (comments) PT IS CURRENTLY TAKING THIS MEDICATION. IS NOT ALLERGIC Tramadol  06/07/2017   Systemic Other (comments) BRADYCARDIA Current Immunizations  Reviewed on 6/4/2018 Name Date Influenza High Dose Vaccine PF 11/3/2017, 10/1/2015 Influenza Vaccine Split 11/1/2012 Pneumococcal Vaccine (Unspecified Type) 11/11/2005 Not reviewed this visit You Were Diagnosed With   
  
 Codes Comments ESRD (end stage renal disease) ([facility]    -  Primary ICD-10-CM: N18.6 ICD-9-CM: 585.6 Essential hypertension     ICD-10-CM: I10 
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 
ICD-9-CM: 250.40, 583.81 Vitals BP Pulse Temp Resp Height(growth percentile) Weight(growth percentile) 128/42 (BP 1 Location: Left arm, BP Patient Position: Sitting) 60 98.4 °F (36.9 °C) (Oral) 18 5' 7\" (1.702 m) 134 lb (60.8 kg) SpO2 BMI Smoking Status 98% 20.99 kg/m2 Former Smoker BMI and BSA Data Body Mass Index Body Surface Area  
 20.99 kg/m 2 1.7 m 2 Preferred Pharmacy Pharmacy Name Phone [facility], [address] [place] [address] [address] [phone] Your Updated Medication List  
  
   
This list is accurate as of 6/28/18 11:50 AM.  Always use your most recent med list. amLODIPine 10 mg tablet Commonly known as:  Brittney Grebe TAKE 1 TABLET BY MOUTH EVERY DAY  
  
 aspirin delayed-release 81 mg tablet TAKE 1 TABLET BY MOUTH EVERY DAY  
  
 atorvastatin 80 mg tablet Commonly known as:  LIPITOR  
TAKE 1 TABLET BY MOUTH EVERY NIGHT AT BEDTIME  
  
 AURYXIA 210 mg iron tablet Generic drug:  ferric citrate Take 210 mg by mouth Before breakfast, lunch, and dinner. calcium-cholecalciferol (D3) tablet Commonly known as:  Calcium 600 + D  
TAKE 1 TABLET BY MOUTH EVERY DAY  
  
 carvedilol 12.5 mg tablet Commonly known as:  [place] Take 1 Tab by mouth two (2) times daily (with meals). Indications: hypertension  
  
 clopidogrel 75 mg Tab Commonly known as:  PLAVIX Take 1 Tab by mouth daily. Indications: Acute Coronary Syndrome  
  
 docusate sodium 50 mg capsule Commonly known as:  Jessica Sauce Take 1 Cap by mouth two (2) times daily as needed for Constipation. folic acid 1 mg tablet Commonly known as:  FOLVITE  
TAKE 1 TABLET BY MOUTH EVERY DAY  
  
 furosemide 40 mg tablet Commonly known as:  LASIX TAKE 1 TABLET BY MOUTH EVERY DAY  
  
 glucose blood VI test strips strip Commonly known as:  FREESTYLE PRECISION MESHA STRIPS Use to check BS three times daily. Test before Breakfast, lunch and dinner. hydrALAZINE 100 mg tablet Commonly known as:  APRESOLINE  
 Take 1 Tab by mouth three (3) times daily. Indications: hypertension  
  
 insulin lispro  & lisp protamine (human) 100 unit/mL (75-25) Inpn Commonly known as:  HumaLOG Mix 75-25 KwikPen 10 units am and 8 units pm  
  
 Insulin Needles (Disposable) 32 gauge x 5/32\" Ndle Commonly known as:  Rose Pen Needle USE TWICE DAILY AS DIRECTED  
  
 isosorbide mononitrate ER 30 mg tablet Commonly known as:  IMDUR Take 1 Tab by mouth daily. lisinopril 20 mg tablet Commonly known as:  PRINIVIL, ZESTRIL  
TAKE 2 TABLETS BY MOUTH EVERY DAY Prescriptions Sent to Pharmacy Refills  
 clopidogrel (PLAVIX) 75 mg tab 3 Sig: Take 1 Tab by mouth daily. Indications: Acute Coronary Syndrome Class: Normal  
 Pharmacy: [facility] [address] [facility] [address] [facility] Ph #: [phone] Route: Oral  
 furosemide (LASIX) 40 mg tablet 3 Sig: TAKE 1 TABLET BY MOUTH EVERY DAY Class: Normal  
 Pharmacy: [facility] [address] [facility] Ph #: [phone]  
 folic acid (FOLVITE) 1 mg tablet 3 Sig: TAKE 1 TABLET BY MOUTH EVERY DAY Class: Normal  
 Pharmacy: [facility] [address] [facility] [address] [facility] [address] [facility] Ph #: [phone] Follow-up Instructions Return if symptoms worsen or fail to improve. Patient Instructions Nutrition Tips for Diabetes in Children: Care Instructions Your Care Instructions When your child has diabetes, it's very important to control his or her blood sugar. This means that you need to pay attention to how often and how much your child eats certain foods. But your child can still eat what your family eats. This includes occasional sweets and other favorites. Make sure that your child eats a variety of foods.  It's also important to spread carbohydrate throughout the day. Carbohydrate raises blood sugar more than any other nutrient. It's found in sugar, breads, and cereals. It's also found in fruit, starchy vegetables like potatoes and corn, milk, and yogurt. You may want to plan your child's meals and snacks with a dietitian or diabetes educator. They can help you choose the best foods and help with weight loss, if that's a goal. The tips below may also help your child enjoy meals and stay healthy. Follow-up care is a key part of your child's treatment and safety. Be sure to make and go to all appointments, and call your doctor if your child is having problems. It's also a good idea to know your child's test results and keep a list of the medicines your child takes. How can you care for your child at home? · Learn which foods have carbohydrate (carbs). And learn how much is okay for your child. A dietitian or diabetes educator can help you and your child keep track of carbs. · Spread your child's carbs throughout the day. You want your child to eat some at all meals. But you don't want your child to eat too much at one time. · Plan meals to include food from all the food groups. These are the food groups and some example serving sizes: ¨ Grains: 1 slice of bread (1 ounce), ½ cup of cooked cereal, or 1/3 cup of cooked pasta or rice. These have about 15 grams of carbohydrate in a serving. Choose whole grains when you can. These include whole wheat bread or crackers, oatmeal, and brown rice. ¨ Fruit: 1 small fresh fruit, such as an apple or orange; half of a banana; ½ cup of chopped, cooked, or canned fruit; ½ cup of fruit juice; 1 cup of melon or raspberries; or 2 tablespoons of dried fruit. These have about 15 grams of carbohydrate in a serving. ¨ Dairy: 1 cup of nonfat or low-fat milk or 2/3 cup of plain yogurt. These have about 15 grams of carbohydrate in a serving. ¨ Protein foods: A serving size of meat is 3 ounces. That's about the size of a deck of cards. Examples of other serving sizes of protein foods (equal to 1 ounce of meat) are 1/4 cup of cottage cheese, 1 egg, 1 tablespoon of peanut butter, and ½ cup of tofu. These have very little or no carbs per serving. ¨ Vegetables: Starchy vegetables have about 15 grams of carbohydrate. A serving is ½ cup of cooked beans, peas, potatoes, or corn. Nonstarchy vegetables have very little carbohydrate. A serving is 1 cup of raw leafy vegetables, ½ cup of other vegetables, or 3/4 cup of vegetable juice. · Use the plate format to plan your child's meals. It is a good, quick way to make sure that your child has a balanced meal. It also helps you spread your child's carbs throughout the day. Divide your child's plate by types of foods. Put vegetables on half the plate. Put meat or other proteins on one-quarter of the plate. And put a grain or starchy vegetable (such as brown rice or a potato) in the last quarter. You may also be able to add a small piece of fruit and 1 cup of milk or yogurt. But it depends on how much carbohydrate your child is supposed to eat. · Talk to your dietitian or diabetes educator about ways to add limited sweets. Your child can eat sweets once in a while. But you need to count the amount of carbs as part the daily amount. · Protein, fat, and fiber do not raise blood sugar as much as carbs do. Meals with a lot of these nutrients will help keep your child's blood sugar from rising quickly. · Limit saturated fats. These include fats in meat and dairy products. Try to replace them with small amounts of monounsaturated fat, such as olive oil. This can help protect your child's heart. People who have diabetes are at higher risk of heart disease. · Ask your doctor about what type of daily activity is safe for your child. Exercise can help manage blood sugar.  It can also make your child feel good and have more energy. When your child eats out · It's a good idea for you and your child to learn to estimate the serving sizes of foods that have carbohydrate. If you measure food at home, it will be easier to estimate the amount in a serving of restaurant food. · If the meal you order for your child has too much carbohydrate (such as potatoes, corn, or baked beans), ask to have a low-carbohydrate food instead. Ask for a salad or green vegetables. · If your child uses insulin, check his or her blood sugar before and after eating out. This can help you and your child plan how much to eat in the future. Where can you learn more? Go to http://melissa-carter.info/. Enter P102 in the search box to learn more about \"Nutrition Tips for Diabetes in Children: Care Instructions. \" Current as of: March 13, 2017 Content Version: 11.4 © 2052-9222 [employer]. Care instructions adapted under license by [employer] (which disclaims liability or warranty for this information). If you have questions about a medical condition or this instruction, always ask your healthcare professional. [place] [address] any warranty or liability for your use of this information. Introducing [facility]! Chaz Bolanos introduces [employer] patient portal. Now you can access parts of your medical record, email your doctor's office, and request medication refills online. 1. In your internet browser, go to [email or web address] 2. Click on the First Time User? Click Here link in the Sign In box. You will see the New Member Sign Up page. 3. Enter your [employer] Access Code exactly as it appears below. You will not need to use this code after youve completed the sign-up process. If you do not sign up before the expiration date, you must request a new code. · [employer] Access Code: [email or web address] Expires: 8/19/2018 10:02 AM 
 
 4. Enter the last four digits of your Social Security Number (xxxx) and Date of Birth (mm/dd/yyyy) as indicated and click Submit. You will be taken to the next sign-up page. 5. Create a [employer] ID. This will be your [employer] login ID and cannot be changed, so think of one that is secure and easy to remember. 6. Create a [employer] password. You can change your password at any time. 7. Enter your Password Reset Question and Answer. This can be used at a later time if you forget your password. 8. Enter your e-mail address. You will receive e-mail notification when new information is available in [address] 9. Click Sign Up. You can now view and download portions of your medical record. 10. Click the Download Summary menu link to download a portable copy of your medical information. If you have questions, please visit the Frequently Asked Questions section of the [employer] website. Remember, [employer] is NOT to be used for urgent needs. For medical emergencies, dial 911. Now available from your iPhone and Android! Please provide this summary of care documentation to your next provider. Your primary care clinician is listed as Tera Mensah. If you have any questions after today's visit, please call [phone].

## 2018-06-29 NOTE — PROGRESS NOTES
6/29/18    Called pt to check on him - [facility].  States he went to dialysis this am and when he got home he thinks he passed out. He is feeling better now so he thinks he was just a little dehydrated and he will only have to stay a day or two. Will f/u after discharge.     Narciso Fragoso, RN

## 2018-07-03 NOTE — PROGRESS NOTES
ROOM # 16  Identified pt with two pt identifiers(name and ). Reviewed record in preparation for visit and have obtained necessary documentation. Chief Complaint   Patient presents with   [facility] Follow Up     er f/u hr low      Kely Styles preferred language for health care discussion is english/other. Is the patient using any DME equipment during OV? NO    Kely Styles is due for:  Health Maintenance Due   Topic    MICROALBUMIN Q1     FOOT EXAM Q1     EYE EXAM RETINAL OR DILATED Q1     MEDICARE YEARLY EXAM      Health Maintenance reviewed and discussed per provider  Please order/place referral if appropriate. Advance Directive:  1. Do you have an advance directive in place? Patient Reply: NO    2. If not, would you like material regarding how to put one in place? NO    Coordination of Care:  1. Have you been to the ER, urgent care clinic since your last visit? Hospitalized since your last visit? YES    2. Have you seen or consulted any other health care providers outside of the [address] [place] [address] since your last visit? Include any pap smears or colon screening. YES    Patient is accompanied by self I have received verbal consent from Kely Styles to discuss any/all medical information while they are present in the room.     Learning Assessment:  Learning Assessment 2015   PRIMARY LEARNER Patient Patient   HIGHEST LEVEL OF EDUCATION - PRIMARY LEARNER  GRADUATED HIGH SCHOOL OR GED GRADUATED HIGH SCHOOL OR GED   PRIMARY LANGUAGE ENGLISH ENGLISH    NEED - No   LEARNER PREFERENCE PRIMARY DEMONSTRATION VIDEOS   LEARNING SPECIAL TOPICS - no   ANSWERED BY patient patient   RELATIONSHIP SELF SELF     Depression Screening:  PHQ over the last two weeks 7/3/2018 2018 2018 2018 2018 2/15/2018 2017   Little interest or pleasure in doing things Not at all Not at all Not at all Not at all Not at all Not at all Not at all   Feeling down, depressed or hopeless Not at all Not at all Not at all Not at all Not at all Not at all Not at all   Total Score PHQ 2 0 0 0 0 0 0 0     Abuse Screening:  Abuse Screening Questionnaire 6/2/2017 5/11/2016 6/8/2015 3/4/2015   Do you ever feel afraid of your partner? N N N N   Are you in a relationship with someone who physically or mentally threatens you? N N N N   Is it safe for you to go home? Dinorah Olguin     Fall Risk  Fall Risk Assessment, last 12 mths 7/3/2018 6/28/2018 6/5/2018 5/30/2018 5/21/2018 2/15/2018 6/13/2017   Able to walk? Yes Yes Yes Yes Yes Yes Yes   Fall in past 12 months?  No No No No No No No

## 2018-07-03 NOTE — PATIENT INSTRUCTIONS
Dizziness: Care Instructions  Your Care Instructions  Dizziness is the feeling of unsteadiness or fuzziness in your head. It is different than having vertigo, which is a feeling that the room is spinning or that you are moving or falling. It is also different from lightheadedness, which is the feeling that you are about to faint. It can be hard to know what causes dizziness. Some people feel dizzy when they have migraine headaches. Sometimes bouts of flu can make you feel dizzy. Some medical conditions, such as heart problems or high blood pressure, can make you feel dizzy. Many medicines can cause dizziness, including medicines for high blood pressure, pain, or anxiety. If a medicine causes your symptoms, your doctor may recommend that you stop or change the medicine. If it is a problem with your heart, you may need medicine to help your heart work better. If there is no clear reason for your symptoms, your doctor may suggest watching and waiting for a while to see if the dizziness goes away on its own. Follow-up care is a key part of your treatment and safety. Be sure to make and go to all appointments, and call your doctor if you are having problems. It's also a good idea to know your test results and keep a list of the medicines you take. How can you care for yourself at home? · If your doctor recommends or prescribes medicine, take it exactly as directed. Call your doctor if you think you are having a problem with your medicine. · Do not drive while you feel dizzy. · Try to prevent falls. Steps you can take include:  ¨ Using nonskid mats, adding grab bars near the tub, and using night-lights. ¨ Clearing your home so that walkways are free of anything you might trip on. ¨ Letting family and friends know that you have been feeling dizzy. This will help them know how to help you. When should you call for help? Call 911 anytime you think you may need emergency care.  For example, call if:  ? · You passed out (lost consciousness). ? · You have dizziness along with symptoms of a heart attack. These may include:  ¨ Chest pain or pressure, or a strange feeling in the chest.  ¨ Sweating. ¨ Shortness of breath. ¨ Nausea or vomiting. ¨ Pain, pressure, or a strange feeling in the back, neck, jaw, or upper belly or in one or both shoulders or arms. ¨ Lightheadedness or sudden weakness. ¨ A fast or irregular heartbeat. ? · You have symptoms of a stroke. These may include:  ¨ Sudden numbness, tingling, weakness, or loss of movement in your face, arm, or leg, especially on only one side of your body. ¨ Sudden vision changes. ¨ Sudden trouble speaking. ¨ Sudden confusion or trouble understanding simple statements. ¨ Sudden problems with walking or balance. ¨ A sudden, severe headache that is different from past headaches. ?Call your doctor now or seek immediate medical care if:  ? · You feel dizzy and have a fever, headache, or ringing in your ears. ? · You have new or increased nausea and vomiting. ? · Your dizziness does not go away or comes back. ? Watch closely for changes in your health, and be sure to contact your doctor if:  ? · You do not get better as expected. Where can you learn more? Go to http://melissa-carter.info/. Enter F805 in the search box to learn more about \"Dizziness: Care Instructions. \"  Current as of: March 20, 2017  Content Version: 11.4  © 8262-9987 [employer]. Care instructions adapted under license by [employer] (which disclaims liability or warranty for this information). If you have questions about a medical condition or this instruction, always ask your healthcare professional. [place] [address] any warranty or liability for your use of this information.

## 2018-07-03 NOTE — MR AVS SNAPSHOT
[address] [facility] 
 
 
 [address] [facility] [address] [address] 
[phone] Patient: Marie Do MRN: [id number] PYR:[id number] Visit Information Date & Time Provider Department Dept. Phone Encounter #  
 7/3/2018  7:30 AM Kathia Wright, [address] [phone] Follow-up Instructions Return in about 3 months (around 10/3/2018), or if symptoms worsen or fail to improve. Upcoming Health Maintenance Date Due MICROALBUMIN Q1 12/6/2017 FOOT EXAM Q1 4/20/2018 EYE EXAM RETINAL OR DILATED Q1 5/18/2018 MEDICARE YEARLY EXAM 6/3/2018 Influenza Age 5 to Adult 8/1/2018 HEMOGLOBIN A1C Q6M 11/26/2018 GLAUCOMA SCREENING Q2Y 5/18/2019 LIPID PANEL Q1 5/26/2019 COLONOSCOPY 7/8/2021 DTaP/Tdap/Td series (2 - Td) 6/2/2027 Allergies as of 7/3/2018  Review Complete On: 7/3/2018 By: Shabnam Jimenez Severity Noted Reaction Type Reactions Iodinated Contrast- Oral And Iv Dye  06/07/2017    Other (comments) PT HAS RENAL DISEASE AND IS UNABLE TO TOLERATE Lisinopril  11/20/2014    Other (comments) PT IS CURRENTLY TAKING THIS MEDICATION. IS NOT ALLERGIC Tramadol  06/07/2017   Systemic Other (comments) BRADYCARDIA Current Immunizations  Reviewed on 6/29/2018 Name Date Influenza High Dose Vaccine PF 11/3/2017, 10/1/2015 Influenza Vaccine Split 11/1/2012 Pneumococcal Vaccine (Unspecified Type) 11/11/2005 Not reviewed this visit You Were Diagnosed With   
  
 Codes Comments Dizziness    -  Primary ICD-10-CM: J92 ICD-9-CM: 780.4 Essential hypertension     ICD-10-CM: I10 
ICD-9-CM: 401.9 Vitals BP Pulse Temp Resp Height(growth percentile) Weight(growth percentile) 155/59 (BP 1 Location: Right arm, BP Patient Position: Sitting) (!) 59 97 °F (36.1 °C) (Oral) 16 5' 7\" (1.702 m) 134 lb (60.8 kg) SpO2 BMI Smoking Status 100% 20.99 kg/m2 Former Smoker BMI and BSA Data Body Mass Index Body Surface Area  
 20.99 kg/m 2 1.7 m 2 Preferred Pharmacy Pharmacy Name Phone [facility], [address] [place] [address].  [id number] [phone] Your Updated Medication List  
  
   
This list is accurate as of 7/3/18  7:44 AM.  Always use your most recent med list. amLODIPine 10 mg tablet Commonly known as:  Helena Michael TAKE 1 TABLET BY MOUTH EVERY EVENING  
  
 aspirin delayed-release 81 mg tablet TAKE 1 TABLET BY MOUTH EVERY DAY  
  
 atorvastatin 80 mg tablet Commonly known as:  LIPITOR  
TAKE 1 TABLET BY MOUTH EVERY NIGHT AT BEDTIME  
  
 AURYXIA 210 mg iron tablet Generic drug:  ferric citrate Take 210 mg by mouth Before breakfast, lunch, and dinner. calcium-cholecalciferol (D3) tablet Commonly known as:  Calcium 600 + D  
TAKE 1 TABLET BY MOUTH EVERY DAY  
  
 carvedilol 12.5 mg tablet Commonly known as:  Filiberto Bret Take 1 Tab by mouth two (2) times daily (with meals). Indications: hypertension  
  
 clopidogrel 75 mg Tab Commonly known as:  PLAVIX Take 1 Tab by mouth daily. Indications: Acute Coronary Syndrome  
  
 docusate sodium 50 mg capsule Commonly known as:  Dorthey Matsu Take 1 Cap by mouth two (2) times daily as needed for Constipation. folic acid 1 mg tablet Commonly known as:  FOLVITE  
TAKE 1 TABLET BY MOUTH EVERY DAY  
  
 furosemide 40 mg tablet Commonly known as:  LASIX TAKE 1 TABLET BY MOUTH EVERY DAY  
  
 glucose blood VI test strips strip Commonly known as:  FREESTYLE PRECISION MESHA STRIPS Use to check BS three times daily. Test before Breakfast, lunch and dinner. hydrALAZINE 100 mg tablet Commonly known as:  APRESOLINE Take 1 Tab by mouth three (3) times daily. Indications: hypertension  
  
 insulin lispro  & lisp protamine (human) 100 unit/mL (75-25) Inpn Commonly known as:  HumaLOG Mix 75-25 KwikPen 10 units am and 8 units pm  
  
 Insulin Needles (Disposable) 32 gauge x 5/32\" Ndle Commonly known as:  Rose Pen Needle USE TWICE DAILY AS DIRECTED  
  
 isosorbide mononitrate ER 30 mg tablet Commonly known as:  IMDUR Take 1 Tab by mouth daily. lisinopril 20 mg tablet Commonly known as:  PRINIVIL, ZESTRIL  
TAKE 1TABLET BY MOUTH TWICE A DAY  Indications: hypertension Prescriptions Sent to Pharmacy Refills  
 lisinopril (PRINIVIL, ZESTRIL) 20 mg tablet 5 Sig: TAKE 1TABLET BY MOUTH TWICE A DAY  Indications: hypertension Class: Normal  
 Pharmacy: [facility] [address] [facility] [phone]  #: [phone] Follow-up Instructions Return in about 3 months (around 10/3/2018), or if symptoms worsen or fail to improve. Patient Instructions Dizziness: Care Instructions Your Care Instructions Dizziness is the feeling of unsteadiness or fuzziness in your head. It is different than having vertigo, which is a feeling that the room is spinning or that you are moving or falling. It is also different from lightheadedness, which is the feeling that you are about to faint. It can be hard to know what causes dizziness. Some people feel dizzy when they have migraine headaches. Sometimes bouts of flu can make you feel dizzy. Some medical conditions, such as heart problems or high blood pressure, can make you feel dizzy. Many medicines can cause dizziness, including medicines for high blood pressure, pain, or anxiety. If a medicine causes your symptoms, your doctor may recommend that you stop or change the medicine. If it is a problem with your heart, you may need medicine to help your heart work better. If there is no clear reason for your symptoms, your doctor may suggest watching and waiting for a while to see if the dizziness goes away on its own. Follow-up care is a key part of your treatment and safety. Be sure to make and go to all appointments, and call your doctor if you are having problems. It's also a good idea to know your test results and keep a list of the medicines you take. How can you care for yourself at home? · If your doctor recommends or prescribes medicine, take it exactly as directed. Call your doctor if you think you are having a problem with your medicine. · Do not drive while you feel dizzy. · Try to prevent falls. Steps you can take include: ¨ Using nonskid mats, adding grab bars near the tub, and using night-lights. ¨ Clearing your home so that walkways are free of anything you might trip on. ¨ Letting family and friends know that you have been feeling dizzy. This will help them know how to help you. When should you call for help? Call 911 anytime you think you may need emergency care. For example, call if: 
? · You passed out (lost consciousness). ? · You have dizziness along with symptoms of a heart attack. These may include: ¨ Chest pain or pressure, or a strange feeling in the chest. 
¨ Sweating. ¨ Shortness of breath. ¨ Nausea or vomiting. ¨ Pain, pressure, or a strange feeling in the back, neck, jaw, or upper belly or in one or both shoulders or arms. ¨ Lightheadedness or sudden weakness. ¨ A fast or irregular heartbeat. ? · You have symptoms of a stroke. These may include: 
¨ Sudden numbness, tingling, weakness, or loss of movement in your face, arm, or leg, especially on only one side of your body. ¨ Sudden vision changes. ¨ Sudden trouble speaking. ¨ Sudden confusion or trouble understanding simple statements. ¨ Sudden problems with walking or balance. ¨ A sudden, severe headache that is different from past headaches. ?Call your doctor now or seek immediate medical care if: 
? · You feel dizzy and have a fever, headache, or ringing in your ears. ? · You have new or increased nausea and vomiting. ? · Your dizziness does not go away or comes back. ? Watch closely for changes in your health, and be sure to contact your doctor if: 
? · You do not get better as expected. Where can you learn more? Go to http://melissa-carter.info/. Enter H950 in the search box to learn more about \"Dizziness: Care Instructions. \" Current as of: March 20, 2017 Content Version: 11.4 © 5018-1515 [employer]. Care instructions adapted under license by [employer] (which disclaims liability or warranty for this information). If you have questions about a medical condition or this instruction, always ask your healthcare professional. [address] any warranty or liability for your use of this information. Introducing [facility]! Chastity Werner introduces [employer] patient portal. Now you can access parts of your medical record, email your doctor's office, and request medication refills online. 1. In your internet browser, go to [email or web address] 2. Click on the First Time User? Click Here link in the Sign In box. You will see the New Member Sign Up page. 3. Enter your [employer] Access Code exactly as it appears below. You will not need to use this code after youve completed the sign-up process. If you do not sign up before the expiration date, you must request a new code. · [employer] Access Code: [email or web address] Expires: 8/19/2018 10:02 AM 
 
4. Enter the last four digits of your Social Security Number (xxxx) and Date of Birth (mm/dd/yyyy) as indicated and click Submit. You will be taken to the next sign-up page. 5. Create a Brandlet ID. This will be your [employer] login ID and cannot be changed, so think of one that is secure and easy to remember. 6. Create a [employer] password. You can change your password at any time. 7. Enter your Password Reset Question and Answer.  This can be used at a later time if you forget your password. 8. Enter your e-mail address. You will receive e-mail notification when new information is available in [address] 9. Click Sign Up. You can now view and download portions of your medical record. 10. Click the Download Summary menu link to download a portable copy of your medical information. If you have questions, please visit the Frequently Asked Questions section of the [employer] website. Remember, [employer] is NOT to be used for urgent needs. For medical emergencies, dial 911. Now available from your iPhone and Android! Please provide this summary of care documentation to your next provider. Your primary care clinician is listed as Tera Mensah. If you have any questions after today's visit, please call [phone].

## 2018-07-03 NOTE — PROGRESS NOTES
HISTORY OF PRESENT ILLNESS  Juanjo Albarran is a 77 y.o. male. HPI Comments: 78 yo male here for f/u of recent ED visit 6/29/18 due to presyncope sx. He reports going to HD that day. Took his BP medication when he got home. Started feeling a little dizzy, sweaty. Though this could be due to blood sugar but sx did not improve with eating. Called 911 and was taken to [facility]. Labs stable there. Sx had resolved. No changes made. Feels he is back at baseline. Review of Systems   Constitutional: Negative for chills, fever and weight loss. HENT: Negative for congestion and ear pain. Eyes: Negative for blurred vision and pain. Respiratory: Negative for cough and shortness of breath. Cardiovascular: Negative for chest pain, palpitations and leg swelling. Gastrointestinal: Negative for nausea and vomiting. Genitourinary: Negative for frequency and urgency. Musculoskeletal: Negative for joint pain and myalgias. Skin: Negative for itching and rash. Neurological: Positive for dizziness (resolved). Negative for tingling and headaches. Psychiatric/Behavioral: Negative for depression. The patient is not nervous/anxious.       Past Medical History:   Diagnosis Date    Anemia in chronic kidney disease     Arthritis     ASCVD (arteriosclerotic cardiovascular disease)     Benign hypertensive kidney disease     Chronic kidney disease     STAGE 3    [facility] with [facility]     Dialysis patient ([facility] 09/2014    Dyslipidemia     GERD (gastroesophageal reflux disease)     Glaucoma     Heart attack ([facility] 08/2014    Hypertension     Left renal artery stenosis (HCC)     NSTEMI (non-ST elevated myocardial infarction) ([facility]     PAD (peripheral artery disease) (HCC)     PAF (paroxysmal atrial fibrillation) ([facility] 9/2014    Pulmonary asbestosis ([facility]     Renal mass, right     Stroke [facility])     TIA     Current Outpatient Prescriptions on File Prior to Visit   Medication Sig Dispense Refill    clopidogrel (PLAVIX) 75 mg tab Take 1 Tab by mouth daily. Indications: Acute Coronary Syndrome 90 Tab 3    furosemide (LASIX) 40 mg tablet TAKE 1 TABLET BY MOUTH EVERY DAY 90 Tab 3    folic acid (FOLVITE) 1 mg tablet TAKE 1 TABLET BY MOUTH EVERY DAY 90 Tab 3    hydrALAZINE (APRESOLINE) 100 mg tablet Take 1 Tab by mouth three (3) times daily. Indications: hypertension 270 Tab 3    carvedilol (COREG) 12.5 mg tablet Take 1 Tab by mouth two (2) times daily (with meals). Indications: hypertension 180 Tab 3    atorvastatin (LIPITOR) 80 mg tablet TAKE 1 TABLET BY MOUTH EVERY NIGHT AT BEDTIME 30 Tab 6    isosorbide mononitrate ER (IMDUR) 30 mg tablet Take 1 Tab by mouth daily. 60 Tab 3    aspirin delayed-release 81 mg tablet TAKE 1 TABLET BY MOUTH EVERY DAY 60 Tab 3    amLODIPine (NORVASC) 10 mg tablet TAKE 1 TABLET BY MOUTH EVERY DAY 60 Tab 3    glucose blood VI test strips (FREESTYLE PRECISION MESHA STRIPS) strip Use to check BS three times daily. Test before Breakfast, lunch and dinner. 100 Strip 3    calcium-cholecalciferol, D3, (CALCIUM 600 + D) tablet TAKE 1 TABLET BY MOUTH EVERY DAY 30 Tab 2    docusate sodium (COLACE) 50 mg capsule Take 1 Cap by mouth two (2) times daily as needed for Constipation. 60 Cap 5    lisinopril (PRINIVIL, ZESTRIL) 20 mg tablet TAKE 2 TABLETS BY MOUTH EVERY DAY 60 Tab 5    AURYXIA 210 mg iron tablet Take 210 mg by mouth Before breakfast, lunch, and dinner. 5    Insulin Lisp & Lisp Prot, Hum, (HUMALOG MIX 75-25 KWIKPEN) 100 unit/mL (75-25) inpn 10 units am and 8 units pm 3 Pen 3    Insulin Needles, Disposable, (OCTAVIO PEN NEEDLE) 32 gauge x 5/32\" ndle USE TWICE DAILY AS DIRECTED 200 Pen Needle 3     No current facility-administered medications on file prior to visit. Physical Exam   Constitutional: He appears well-developed and well-nourished. No distress.    /59 (BP 1 Location: Right arm, BP Patient Position: Sitting)  Pulse (!) 59  Temp 97 °F (36.1 °C) (Oral)   Resp 16  Ht 5' 7\" (1.702 m)  Wt 134 lb (60.8 kg)  SpO2 100%  BMI 20.99 kg/m2     Eyes: EOM are normal. Right eye exhibits no discharge. Left eye exhibits no discharge. No scleral icterus. Neck: Neck supple. Cardiovascular: Normal rate, regular rhythm and normal heart sounds. Exam reveals no gallop and no friction rub. No murmur heard. Pulmonary/Chest: Effort normal and breath sounds normal. No respiratory distress. He has no wheezes. He has no rales. Musculoskeletal: He exhibits no edema or tenderness. Lymphadenopathy:     He has no cervical adenopathy. Neurological: He is alert. He exhibits normal muscle tone. Skin: Skin is warm and dry. Psychiatric: He has a normal mood and affect. ASSESSMENT and PLAN    ICD-10-CM ICD-9-CM    1. Dizziness R42 780.4    2. Essential hypertension I10 401.9      Sx resolved. Will try changing the timing of lisinopril and norvasc to see if this helps in future. Asked that he notify dialysis center of recent events.

## 2018-07-23 NOTE — PROGRESS NOTES
Follow up     Date 7/23/18  Time:  4:15pm    Called patient today in follow up. Patient states he is doing \"as well as can be expected\". He now goes to dialysis three times a week. He says that dialysis is \"OK\" - \" it is , what it is and I have to accept it\". He has not had any further episodes of dizziness since being in [address] [place] [address] ED on 6/29/18. He said they told him to hold his AM meds on the mornings he goes to dialysis as they thought that might be contributing to the dizziness. I asked him about his management of his medications and taking as directed and he states he is doing a much better job with that now. He states that he understands the importance of taking as directed. Patient has graduated from the Transitions of Care Coordination  program on 7/23/18. Patient's symptoms are stable at this time. Patient/family has the ability to self-manage. Care management goals have been completed at this time. No further nurse navigator follow up scheduled. Goals Addressed      COMPLETED: Knowledge and adherence of medication (ie. antiplatelet, B blocker, ACE, lipid lowering agent, action, side effects, missed dose, etc.)                  5/30/18 Pt admits to non compliance with \"some of his medications\"     -given daily pill box and has appt next week w/ pharm D to review meds and see if we can simplify regime and improve compliance    7/23/18 Pt is now taking medication as directed        COMPLETED: Prepare patients and caregivers for end of life decisions (ie. need for hospice, pain management, symptom relief, advance directives etc.)                  Given booklet and blank ACP form for pt and wife to discuss and fill out.    7/23/18 Have filled it out and will bring to office .  COMPLETED: Understands red flags post discharge. 7/23/18  Has not been back to the hospital other than when he almost passed out after dialysis treatment.   Knows to call or go to ED for any abnormal symptoms . Pt has nurse navigator's contact information for any further questions, concerns, or needs.   Patients upcoming visits:    Future Appointments  Date Time Provider Luisa Mills   10/16/2018 11:00 AM Anabel Parson MD [id number] [facility]

## 2018-07-30 NOTE — TELEPHONE ENCOUNTER
Pt wife called  requesting a referral to a cardiologist for heart issues , Pt was at the ED 07/30/18 for dizziness after going to the restroom , Pt declined to come in for appt , pt stated he been seen at [facility] for this same issue      -Pt do not have a referral preference

## 2018-07-31 NOTE — TELEPHONE ENCOUNTER
Patient's wife is calling in, stating the referral that was entered was incorrect.   States the patient already has a Cardiologist, they were looking for a referral to Endocrinology, per his Cardiologists request.

## 2018-08-01 NOTE — TELEPHONE ENCOUNTER
Attempted to contact pt's wife at [facility] number, no answer. m for pt wife to return call to office at [phone]. Will continue to try to contact pt.

## 2018-08-01 NOTE — TELEPHONE ENCOUNTER
Pt wife contacted. 2 pt identifiers confirmed. Pt wife states she does not have information on cardiologist at this time but she will collect information on cardiologist so that this office can obtain records. Once she has information she will call back to this office.

## 2018-08-02 NOTE — TELEPHONE ENCOUNTER
[facility]. Spoke with nurse Tina Deleon. 2 pt identifiers confirmed. She states that she will fax over pt office visit note once she checks with doctor and he is not due back to office until Monday August 6. She reviewed the hand written notes from MD and could not make out anything regarding a referral to endocrinology. States she will follow up with the office on Monday when doctor returns.

## 2018-08-02 NOTE — TELEPHONE ENCOUNTER
Pt wife contacted. 2 pt identifiers confirmed. Pt wife states the name of the cardiologist that pt saw was [facility] Dr Syeda Tsai [phone]. Attempted to contact [facility], could not get person on the phone. Will try call again later.

## 2018-10-18 NOTE — PROGRESS NOTES
HISTORY OF PRESENT ILLNESS Salud Sainz is a 77 y.o. male. 76 yo male here for f/u of DM, HTN. BP elevated today. Doesn't take meds on HD days. Hasnt taken meds yet today. Feels it is controlled when he takes these. No further dizziness. Fatigue better since on HD. DM glc doing better. Labs done with HD. Folllowed by Podiatry for DM foot care Review of Systems Constitutional: Negative for chills, fever and weight loss. HENT: Negative for congestion and ear pain. Eyes: Negative for blurred vision and pain. Respiratory: Negative for cough and shortness of breath. Cardiovascular: Negative for chest pain, palpitations and leg swelling. Gastrointestinal: Negative for nausea and vomiting. Genitourinary: Negative for frequency and urgency. Musculoskeletal: Negative for joint pain and myalgias. Skin: Negative for itching and rash. Neurological: Negative for dizziness, tingling and headaches. Psychiatric/Behavioral: Negative for depression. The patient is not nervous/anxious. Past Medical History:  
Diagnosis Date  Anemia in chronic kidney disease  Arthritis  ASCVD (arteriosclerotic cardiovascular disease)  Benign hypertensive kidney disease  Chronic kidney disease STAGE 3  
 Diabetes mellitus with chronic kidney disease ([address]  Dialysis patient [facility]) 09/2014  Dyslipidemia  GERD (gastroesophageal reflux disease)  Glaucoma  Heart attack ([address] 08/2014  Hypertension  Left renal artery stenosis (HCC)  NSTEMI (non-ST elevated myocardial infarction) ([address]  PAD (peripheral artery disease) ([address]  PAF (paroxysmal atrial fibrillation) ([address] 9/2014  Pulmonary asbestosis ([address]  Renal mass, right  Stroke ([address] TIA Current Outpatient Medications on File Prior to Visit Medication Sig Dispense Refill  Sigma Force PRECISION MESHA STRIPS strip USE TO CHECK BLOOD SUGAR 3 TIMES A DAY(BEFORE BREAKFAST, LUNCH, DINNER) 100 Strip 0  
 FREESTYLE PRECISION MESHA STRIPS strip USE TO CHECK BLOOD SUGAR 3 TIMES A DAY(BEFORE BREAKFAST, LUNCH, DINNER) 100 Strip 0  
 lisinopril (PRINIVIL, ZESTRIL) 20 mg tablet TAKE 1TABLET BY MOUTH TWICE A DAY  Indications: hypertension 120 Tab 5  
 amLODIPine (NORVASC) 10 mg tablet TAKE 1 TABLET BY MOUTH EVERY EVENING 60 Tab 3  clopidogrel (PLAVIX) 75 mg tab Take 1 Tab by mouth daily. Indications: Acute Coronary Syndrome 90 Tab 3  furosemide (LASIX) 40 mg tablet TAKE 1 TABLET BY MOUTH EVERY DAY 90 Tab 3  
 folic acid (FOLVITE) 1 mg tablet TAKE 1 TABLET BY MOUTH EVERY DAY 90 Tab 3  
 hydrALAZINE (APRESOLINE) 100 mg tablet Take 1 Tab by mouth three (3) times daily. Indications: hypertension 270 Tab 3  carvedilol (COREG) 12.5 mg tablet Take 1 Tab by mouth two (2) times daily (with meals). Indications: hypertension 180 Tab 3  
 Insulin Needles, Disposable, (OCTAVIO PEN NEEDLE) 32 gauge x 5/32\" ndle USE TWICE DAILY AS DIRECTED 200 Pen Needle 3  
 atorvastatin (LIPITOR) 80 mg tablet TAKE 1 TABLET BY MOUTH EVERY NIGHT AT BEDTIME 30 Tab 6  
 isosorbide mononitrate ER (IMDUR) 30 mg tablet Take 1 Tab by mouth daily. 60 Tab 3  
 aspirin delayed-release 81 mg tablet TAKE 1 TABLET BY MOUTH EVERY DAY 60 Tab 3  
 calcium-cholecalciferol, D3, (CALCIUM 600 + D) tablet TAKE 1 TABLET BY MOUTH EVERY DAY 30 Tab 2  
 docusate sodium (COLACE) 50 mg capsule Take 1 Cap by mouth two (2) times daily as needed for Constipation. 60 Cap 5  AURYXIA 210 mg iron tablet Take 210 mg by mouth Before breakfast, lunch, and dinner. 5  
 Insulin Lisp & Lisp Prot, Hum, (HUMALOG MIX 75-25 KWIKPEN) 100 unit/mL (75-25) inpn 10 units am and 8 units pm 3 Pen 3 No current facility-administered medications on file prior to visit. Physical Exam  
Constitutional: He appears well-developed and well-nourished. No distress. /64 (BP 1 Location: Left arm, BP Patient Position: Sitting)   Pulse 69   Temp 97.5 °F (36.4 °C) (Oral)   Resp 18   Ht 5' 7\" (1.702 m)   Wt 132 lb 6.4 oz (60.1 kg)   SpO2 98%   BMI 20.74 kg/m² Eyes: EOM are normal. Right eye exhibits no discharge. Left eye exhibits no discharge. No scleral icterus. Neck: Neck supple. Cardiovascular: Normal rate, regular rhythm and normal heart sounds. Exam reveals no gallop and no friction rub. No murmur heard. Pulmonary/Chest: Effort normal and breath sounds normal. No respiratory distress. He has no wheezes. He has no rales. Musculoskeletal: He exhibits no edema or tenderness. Lymphadenopathy:  
  He has no cervical adenopathy. Neurological: He is alert. He exhibits normal muscle tone. Skin: Skin is warm and dry. Psychiatric: He has a normal mood and affect. Lab Results Component Value Date/Time Hemoglobin A1c 6.2 05/26/2018 01:01 PM  
 Hemoglobin A1c (POC) 7.5 04/20/2017 08:15 AM  
 
Lab Results Component Value Date/Time Sodium 138 07/29/2018 03:05 PM  
 Potassium 4.5 07/29/2018 03:05 PM  
 Chloride 100 07/29/2018 03:05 PM  
 CO2 31 07/29/2018 03:05 PM  
 Anion gap 8 07/29/2018 03:05 PM  
 Glucose 104 07/29/2018 03:05 PM  
 BUN 65 (H) 07/29/2018 03:05 PM  
 Creatinine 8.0 (H) 07/29/2018 03:05 PM  
 BUN/Creatinine ratio 18 03/04/2015 02:14 PM  
 GFR est AA 8.0 07/29/2018 03:05 PM  
 GFR est non-AA 7 07/29/2018 03:05 PM  
 Calcium 8.5 07/29/2018 03:05 PM  
 Bilirubin, total 0.4 07/29/2018 03:05 PM  
 AST (SGOT) 16 07/29/2018 03:05 PM  
 Alk. phosphatase 79 07/29/2018 03:05 PM  
 Protein, total 7.7 07/29/2018 03:05 PM  
 Albumin 3.9 07/29/2018 03:05 PM  
 A-G Ratio 1.6 08/21/2014 02:41 PM  
 ALT (SGPT) 27 07/29/2018 03:05 PM  
 
ASSESSMENT and PLAN 
  ICD-10-CM ICD-9-CM 1. Essential hypertension I10 401.9 2.  Controlled type 2 diabetes mellitus with chronic kidney disease on chronic dialysis, with long-term current use of insulin ([facility]) E11.22 250.40 N18.6 585.9   
 Z79.4 V58.67   
 Z99.2 V45.11   
3. Encounter for immunization Z23 V03.89 INFLUENZA VACCINE INACTIVATED (IIV), SUBUNIT, ADJUVANTED, IM  
4. Type 2 diabetes mellitus without complication, with long-term current use of insulin ([facility]) E11.9 250.00 insulin lispro  & lisp protamine, human, (HUMALOG MIX 75-25 KWIKPEN) 100 unit/mL (75-25) in  
 Z79.4 V58.67 Will continue with current medication for now and monitor. He will bring record of labs from dialysis Insulin refilled. Flu shot today.

## 2018-10-18 NOTE — PROGRESS NOTES
ROOM # [id number] Identified pt with two pt identifiers(name and ). Reviewed record in preparation for visit and have obtained necessary documentation. Chief Complaint Patient presents with  Hypertension  Immunization/Injection  
  flu vaccine Gorge Markham preferred language for health care discussion is english/other. Is the patient using any DME equipment during OV? NO Gorge Dickersonkary is due for: 
Health Maintenance Due Topic  Shingrix Vaccine Age 50> (1 of 2)  MICROALBUMIN Q1   
 FOOT EXAM Q1   
 EYE EXAM RETINAL OR DILATED Q1   
 MEDICARE YEARLY EXAM   
 Influenza Age 5 to Adult Health Maintenance reviewed and discussed per provider Please order/place referral if appropriate. Advance Directive: 1. Do you have an advance directive in place? Patient Reply: NO 
 
2. If not, would you like material regarding how to put one in place? NO Coordination of Care: 1. Have you been to the ER, urgent care clinic since your last visit? Hospitalized since your last visit? NO 
 
2. Have you seen or consulted any other health care providers outside of the [address] [place] [address] since your last visit? Include any pap smears or colon screening. YES Patient is accompanied by self I have received verbal consent from Gorge Markham to discuss any/all medical information while they are present in the room. Learning Assessment: 
Learning Assessment 2015 PRIMARY LEARNER Patient Patient HIGHEST LEVEL OF EDUCATION - PRIMARY LEARNER  GRADUATED HIGH SCHOOL OR GED GRADUATED HIGH SCHOOL OR GED PRIMARY LANGUAGE ENGLISH ENGLISH  NEED - No  
LEARNER PREFERENCE PRIMARY DEMONSTRATION VIDEOS  
LEARNING SPECIAL TOPICS - no ANSWERED BY patient patient RELATIONSHIP SELF SELF Depression Screening: PHQ over the last two weeks 10/18/2018 7/3/2018 2018 2018 2018 2018 2/15/2018 Little interest or pleasure in doing things Not at all Not at all Not at all Not at all Not at all Not at all Not at all Feeling down, depressed, irritable, or hopeless Not at all Not at all Not at all Not at all Not at all Not at all Not at all Total Score PHQ 2 0 0 0 0 0 0 0 Abuse Screening: 
Abuse Screening Questionnaire 6/2/2017 5/11/2016 6/8/2015 3/4/2015 Do you ever feel afraid of your partner? N N N N Are you in a relationship with someone who physically or mentally threatens you? N N N N Is it safe for you to go home? Ev Haywood Fall Risk Fall Risk Assessment, last 12 mths 10/18/2018 7/3/2018 6/28/2018 6/5/2018 5/30/2018 5/21/2018 2/15/2018 Able to walk? Yes Yes Yes Yes Yes Yes Yes Fall in past 12 months?  No No No No No No No

## 2018-10-18 NOTE — PROGRESS NOTES
High dose flu vaccine first dose administered in lt deltoid with patient's consent. Patient observed for 10 minutes, no reaction noted at present time. Patient tolerated procedure well and left without any complaints. Patient received flu VIS sheet and verbalized understanding.

## 2018-10-18 NOTE — PATIENT INSTRUCTIONS

## 2018-12-13 PROBLEM — L03.119 CELLULITIS OF FOOT: Status: ACTIVE | Noted: 2018-01-01

## 2018-12-14 PROBLEM — Z99.2 ESRD ON HEMODIALYSIS (HCC): Status: ACTIVE | Noted: 2018-01-01

## 2018-12-14 PROBLEM — N18.6 ESRD ON HEMODIALYSIS (HCC): Status: ACTIVE | Noted: 2018-01-01

## 2018-12-31 PROBLEM — I73.9 PERIPHERAL VASCULAR DISEASE (HCC): Status: ACTIVE | Noted: 2018-01-01

## 2018-12-31 PROBLEM — I73.9 PAD (PERIPHERAL ARTERY DISEASE) (HCC): Status: ACTIVE | Noted: 2018-01-01

## 2019-12-05 NOTE — TELEPHONE ENCOUNTER
I will need to see what question his cardiologist would like answered in order to have referral entered correctly. Please obtain records. Controlled with current regime

## 2020-07-14 NOTE — TELEPHONE ENCOUNTER
Pt contacted at home number. 2 pt identifiers confirmed. Pt informed of below. Pt verbalized understanding. No other questions at this time. To get better and follow your care plan as instructed.